# Patient Record
Sex: FEMALE | Race: OTHER | URBAN - METROPOLITAN AREA
[De-identification: names, ages, dates, MRNs, and addresses within clinical notes are randomized per-mention and may not be internally consistent; named-entity substitution may affect disease eponyms.]

---

## 2022-02-23 ENCOUNTER — OFFICE VISIT (OUTPATIENT)
Dept: FAMILY MEDICINE CLINIC | Facility: CLINIC | Age: 32
End: 2022-02-23
Payer: COMMERCIAL

## 2022-02-23 VITALS
HEART RATE: 65 BPM | WEIGHT: 123.38 LBS | DIASTOLIC BLOOD PRESSURE: 70 MMHG | SYSTOLIC BLOOD PRESSURE: 124 MMHG | OXYGEN SATURATION: 100 % | BODY MASS INDEX: 23.91 KG/M2 | HEIGHT: 60.24 IN | RESPIRATION RATE: 18 BRPM | TEMPERATURE: 98 F

## 2022-02-23 DIAGNOSIS — Z30.09 BIRTH CONTROL COUNSELING: Primary | ICD-10-CM

## 2022-02-23 LAB
CONTROL LINE PRESENT WITH A CLEAR BACKGROUND (YES/NO): YES YES/NO
PREGNANCY TEST, URINE: NEGATIVE

## 2022-02-23 PROCEDURE — 3078F DIAST BP <80 MM HG: CPT | Performed by: FAMILY MEDICINE

## 2022-02-23 PROCEDURE — 3074F SYST BP LT 130 MM HG: CPT | Performed by: FAMILY MEDICINE

## 2022-02-23 PROCEDURE — 81025 URINE PREGNANCY TEST: CPT | Performed by: FAMILY MEDICINE

## 2022-02-23 PROCEDURE — 3008F BODY MASS INDEX DOCD: CPT | Performed by: FAMILY MEDICINE

## 2022-02-23 PROCEDURE — 99203 OFFICE O/P NEW LOW 30 MIN: CPT | Performed by: FAMILY MEDICINE

## 2022-02-23 RX ORDER — NORETHINDRONE ACETATE AND ETHINYL ESTRADIOL, ETHINYL ESTRADIOL AND FERROUS FUMARATE 1MG-10(24)
1 KIT ORAL DAILY
Qty: 3 EACH | Refills: 11 | Status: SHIPPED | OUTPATIENT
Start: 2022-02-23 | End: 2023-02-23

## 2022-05-11 ENCOUNTER — OFFICE VISIT (OUTPATIENT)
Dept: FAMILY MEDICINE CLINIC | Facility: CLINIC | Age: 32
End: 2022-05-11
Payer: COMMERCIAL

## 2022-05-11 VITALS
DIASTOLIC BLOOD PRESSURE: 70 MMHG | SYSTOLIC BLOOD PRESSURE: 118 MMHG | HEART RATE: 76 BPM | WEIGHT: 123.38 LBS | HEIGHT: 60 IN | BODY MASS INDEX: 24.22 KG/M2 | RESPIRATION RATE: 16 BRPM | TEMPERATURE: 98 F | OXYGEN SATURATION: 98 %

## 2022-05-11 DIAGNOSIS — N30.01 ACUTE CYSTITIS WITH HEMATURIA: ICD-10-CM

## 2022-05-11 DIAGNOSIS — N39.0 URINARY TRACT INFECTION WITHOUT HEMATURIA, SITE UNSPECIFIED: Primary | ICD-10-CM

## 2022-05-11 DIAGNOSIS — R35.0 URINE FREQUENCY: ICD-10-CM

## 2022-05-11 DIAGNOSIS — N89.8 WHITE VAGINAL DISCHARGE: ICD-10-CM

## 2022-05-11 LAB
APPEARANCE: CLEAR
BILIRUBIN: NEGATIVE
GLUCOSE (URINE DIPSTICK): NEGATIVE MG/DL
KETONES (URINE DIPSTICK): NEGATIVE MG/DL
MULTISTIX LOT#: ABNORMAL NUMERIC
NITRITE, URINE: NEGATIVE
PH, URINE: 6 (ref 4.5–8)
PROTEIN (URINE DIPSTICK): NEGATIVE MG/DL
SPECIFIC GRAVITY: >1.005 (ref 1–1.03)
URINE-COLOR: YELLOW
UROBILINOGEN,SEMI-QN: 0.2 MG/DL (ref 0–1.9)

## 2022-05-11 PROCEDURE — 81003 URINALYSIS AUTO W/O SCOPE: CPT | Performed by: FAMILY MEDICINE

## 2022-05-11 PROCEDURE — 87510 GARDNER VAG DNA DIR PROBE: CPT | Performed by: FAMILY MEDICINE

## 2022-05-11 PROCEDURE — 87660 TRICHOMONAS VAGIN DIR PROBE: CPT | Performed by: FAMILY MEDICINE

## 2022-05-11 PROCEDURE — 3008F BODY MASS INDEX DOCD: CPT | Performed by: FAMILY MEDICINE

## 2022-05-11 PROCEDURE — 3078F DIAST BP <80 MM HG: CPT | Performed by: FAMILY MEDICINE

## 2022-05-11 PROCEDURE — 87086 URINE CULTURE/COLONY COUNT: CPT | Performed by: FAMILY MEDICINE

## 2022-05-11 PROCEDURE — 3074F SYST BP LT 130 MM HG: CPT | Performed by: FAMILY MEDICINE

## 2022-05-11 PROCEDURE — 87480 CANDIDA DNA DIR PROBE: CPT | Performed by: FAMILY MEDICINE

## 2022-05-11 PROCEDURE — 99214 OFFICE O/P EST MOD 30 MIN: CPT | Performed by: FAMILY MEDICINE

## 2022-05-11 RX ORDER — SULFAMETHOXAZOLE AND TRIMETHOPRIM 800; 160 MG/1; MG/1
1 TABLET ORAL 2 TIMES DAILY
Qty: 6 TABLET | Refills: 0 | Status: SHIPPED | OUTPATIENT
Start: 2022-05-11 | End: 2022-05-14

## 2022-05-11 RX ORDER — MULTIVIT-MIN/IRON/FOLIC ACID/K 18-600-40
CAPSULE ORAL
COMMUNITY

## 2022-05-12 RX ORDER — FLUCONAZOLE 150 MG/1
150 TABLET ORAL ONCE
Qty: 1 TABLET | Refills: 0 | Status: SHIPPED | OUTPATIENT
Start: 2022-05-12 | End: 2022-05-12

## 2022-10-03 ENCOUNTER — TELEPHONE (OUTPATIENT)
Dept: FAMILY MEDICINE CLINIC | Facility: CLINIC | Age: 32
End: 2022-10-03

## 2022-10-03 DIAGNOSIS — Z3A.01 LESS THAN 8 WEEKS GESTATION OF PREGNANCY: ICD-10-CM

## 2022-10-03 DIAGNOSIS — O98.911 PREGNANCY AND INFECTIOUS DISEASE, FIRST TRIMESTER: Primary | ICD-10-CM

## 2022-10-03 NOTE — TELEPHONE ENCOUNTER
Outreach call to pt, informed of referral for OB  Pt VU denies further needs or questions at this time

## 2022-10-04 ENCOUNTER — TELEPHONE (OUTPATIENT)
Dept: FAMILY MEDICINE CLINIC | Facility: CLINIC | Age: 32
End: 2022-10-04

## 2022-10-04 DIAGNOSIS — Z3A.01 LESS THAN 8 WEEKS GESTATION OF PREGNANCY: Primary | ICD-10-CM

## 2022-10-04 NOTE — TELEPHONE ENCOUNTER
S/w [facility]    Patient informed referral placed and that provider delivers at [facility]. Patient v/u

## 2022-10-04 NOTE — TELEPHONE ENCOUNTER
S/w [facility]    Patient is in search of a new Ob/gyne and is searching for the right fit, patient also requesting a referral in order to receive an appointment with provider [facility].     Referral pended

## 2022-10-04 NOTE — TELEPHONE ENCOUNTER
I signed referral for Dr. Brandi Tipton. Just make pt aware that Clemencia Tipton is not part of [facility]. Only delivers at [facility].    Thanks.

## 2022-10-04 NOTE — TELEPHONE ENCOUNTER
Samantha Valdivia with [facility] offices called asking for a referral with [facility] pre auth to be sent to them for pt to make appts with them since patient is pregnant and insurance needs Pre auth in order for them to see her.

## 2022-10-05 NOTE — TELEPHONE ENCOUNTER
Incoming call by Bob Alfaro from [facility] office, States the referral needs to be authorized by [facility] and should include [address] [place] [address] CPT code 55289  Since referral was placed as internal referral auto authorized.  External referral with requested CPT code pended, sent to provider for review

## 2022-10-07 NOTE — TELEPHONE ENCOUNTER
S/w [facility]    Informed patient that referral has been denied due to provider being out of network. Advised patient to contact her insurance company for a list of contracted ob/gyn providers and to contact us if she had an alternative provider in mind. Patient stated she was concerned she'd have to see different providers each time at [facility] office. Informed patient that this situation is so that she has the opportunity to meet every provider prior to delivery so that the provider on call at her delivery isn't someone she hasn't met. Patient v/u and stated she may keep her appointment with them.

## 2022-10-27 ENCOUNTER — OFFICE VISIT (OUTPATIENT)
Dept: OBGYN CLINIC | Facility: CLINIC | Age: 32
End: 2022-10-27
Payer: COMMERCIAL

## 2022-10-27 ENCOUNTER — ULTRASOUND ENCOUNTER (OUTPATIENT)
Dept: OBGYN CLINIC | Facility: CLINIC | Age: 32
End: 2022-10-27
Payer: COMMERCIAL

## 2022-10-27 VITALS
DIASTOLIC BLOOD PRESSURE: 66 MMHG | HEIGHT: 60 IN | WEIGHT: 123.63 LBS | HEART RATE: 91 BPM | SYSTOLIC BLOOD PRESSURE: 112 MMHG | BODY MASS INDEX: 24.27 KG/M2

## 2022-10-27 DIAGNOSIS — N91.1 SECONDARY AMENORRHEA: Primary | ICD-10-CM

## 2022-10-27 PROCEDURE — 76830 TRANSVAGINAL US NON-OB: CPT | Performed by: OBSTETRICS & GYNECOLOGY

## 2022-10-27 PROCEDURE — 99203 OFFICE O/P NEW LOW 30 MIN: CPT | Performed by: OBSTETRICS & GYNECOLOGY

## 2022-10-27 PROCEDURE — 76856 US EXAM PELVIC COMPLETE: CPT | Performed by: OBSTETRICS & GYNECOLOGY

## 2022-10-27 PROCEDURE — 3078F DIAST BP <80 MM HG: CPT | Performed by: OBSTETRICS & GYNECOLOGY

## 2022-10-27 PROCEDURE — 3074F SYST BP LT 130 MM HG: CPT | Performed by: OBSTETRICS & GYNECOLOGY

## 2022-10-27 PROCEDURE — 3008F BODY MASS INDEX DOCD: CPT | Performed by: OBSTETRICS & GYNECOLOGY

## 2022-10-27 RX ORDER — CHOLECALCIFEROL (VITAMIN D3) 25 MCG
1 TABLET,CHEWABLE ORAL DAILY
COMMUNITY

## 2022-10-27 NOTE — PATIENT INSTRUCTIONS
[facility]- Prenatal Testing     The following information is designed to help you understand some of the optional tests that are available to you to screen for problems in your pregnancy. Before considering any of these tests, you will need to consider the following questions:     [1] What would you do if an abnormality were detected? If the answer is nothing, then you may decide to decline all testing. [2] Would you be willing to undergo testing which might increase your risk of miscarriage, such as an amniocentesis or CVS (see below)? [3] If you have the initial testing, and it indicates that there might be a problem, and you subsequently decide not to do invasive testing such as an amniocentesis, would you worry excessively through the remainder of the pregnancy? The following tests are available to screen for problems in your pregnancy:     [1] First Trimester Screening (also called Nuchal Thickness, Nuchal Translucency or NT)- This is an abdominal ultrasound done between 10 and 14 weeks by a perinatology specialist (Maternal Fetal Medicine, MFM) which measures the thickness of the skin behind your baby’s neck. Increased thickness of the skin in this area has been linked to an increased risk of genetic abnormalities like Down Syndrome. A second visit may be required if the baby is not in the correct position. The ultrasound results are combined with a finger stick blood test to increase the sensitivity of the test.  You will need to schedule an appointment with the Maternal Fetal Medicine office.   Address and phone number below:   Please verify insurance coverage:   CPT code: 21109 (velazquez) or 28454 (twins)   Diagnosis: Genetic Screening- Z36.8A   Maternal Fetal Medicine   Dr. Ale Dangelo, Dr. Evelio Garsia, Dr. Phoebe Ferguson and Dr. Sky Reed [address] [facility]   [place], [address]   Phone [phone]   Fax [phone]     [2]  Cell-Free DNA testing ([id number]/[facility])- This is a blood test done any time after 10-11 weeks which screens for genetic abnormalities like Down Syndrome. It is greater than 98% sensitive, but requires an amniocentesis for confirmation if abnormal.  It can also tell you the sex of the baby. Please call [employer]/[facility] at [phone] to verify insurance coverage:   CPT code: 15822   Diagnosis code: Genetic screening- Z36.8A     [3]  Quad Screen (also called AFP-plus or Tetra Screen)-  This is a simple blood test which screens for both genetic abnormalities like Down Syndrome and neural tube defects (NTDs), such as spina bifida (an abnormal opening in the spine which can cause paralysis). It is usually performed around 16-20 weeks. If the Quad screen comes back abnormal, it does not mean that your baby definitely has an abnormality. It only means that there is an increased risk of an abnormality. Additional testing such as a Level II Ultrasound or Amniocentesis may be recommended (see below). This test is less accurate at picking up genetic abnormalities than the cell-free DNA test.  If you have test #1 or 2, then usually only the AFP part of the Quad screen would be performed to screen for NTD’s (see below). Please verify insurance coverage:   CPT code: 88738   Diagnosis code: Genetic screening- Z36.8A     [4] Alpha Fetoprotein (AFP, MSAFP)- This is a simple blood test that screens for neural tube defects such as spina bifida (an abnormal opening in the spine). It is usually performed around 16-20 weeks. Additional testing such as a Level II ultrasound may be recommended for an abnormal test result. Please verify insurance coverage:   CPT code: 68806   Diagnosis code: Genetic Screening- Z36.8A     [5]  Cystic Fibrosis/SMA Carrier Screening-  Cystic Fibrosis is a genetic disease which causes lung problems in the infant. SMA (spinal muscular atrophy) is a genetic disease that affects the nerve cells of the spinal cord.   These genetic defects would need to be passed from both parents to the child. A blood test is performed on the mother, and if her test is positive, then the father should also be tested. These tests can be done at any time in the pregnancy, usually in the first trimester with your initial OB labs. All babies are screened for cystic fibrosis after birth. Please verify insurance coverage:   Cystic Fibrosis CPT code: 11916     Diagnosis code: Cystic Fibrosis screening- Z13.228   SMA CPT code: 55636  Diagnosis code: Genetic Screening- Z36.8A , or Testing for Genetic Disease Carrier- Z13.71     [6]  Level II Ultrasound-  This is an abdominal ultrasound done at approximately 20-21 weeks by a perinatology specialist (FRANKLIN) at [facility] which looks at many of the baby’s internal structures. Abnormalities in certain structures have been associated with an increased risk of genetic abnormalities. It also screens for NTD’s. This ultrasound would replace the Level I Ultrasound that we normally perform at our office around the same time. [7]  Amniocentesis-  During this procedure, a needle is passed through your abdominal wall to withdrawal some amniotic fluid from around the baby. It screens for both genetic abnormalities and NTD’s, and is usually performed around 15-16 weeks. This test has the highest accuracy for detecting genetic abnormalities, but there may be a small risk of miscarriage or other complications. A similar procedure called Chorionic Villus Sampling (CVS) is performed by passing a catheter through the vagina to remove a small sample of tissue from the placenta (afterbirth). CVS may have a higher risk of miscarriage and other rare complications compared to amniocentesis, but can be performed earlier in pregnancy.   Amniocentesis is often recommended/offered if any of the previously mentioned tests come back abnormal.  A pamphlet is available if you would like more information about this option. If you decide to have an amniocentesis, the other tests would be unnecessary. The above information covers some of the basics. Pamphlets on the Cell-Free DNA test, Quad Screen and Cystic Fibrosis test should be in your prenatal packet. Your doctor will discuss this information in more detail, and feel free to ask additional questions. Also, remember that all of these tests are optional, and will only be performed at your request.  Testing that needs to be done through the perinatologist's office may require 2-3 weeks lead time to schedule. During pregnancy, here are some general recommendations:  Prenatal Vitamins: Pregnant women should consume the following each day through diet or supplements:  o Folic acid 283-677 micrograms   o Iron 30 mg (or be screened for anemia)  o Vitamin D 600 international units  o Calcium 1,000 mg  Avoid cigarette smoking, alcohol, drugs, and vaping  Avoid unpasteurized cheeses  Avoid raw or undercooked fish or meats  If you have deli meat, please microwave it or cook on the stove until steaming  Artificial Sweeteners: Artificial sweeteners can be used in pregnancy. Data regarding saccharin are conflicting. Low (typical) consumption is likely safe  Caffeine: Low-to-moderate caffeine intake in pregnancy does not appear to be associated with any adverse outcomes. Pregnant women may have caffeine but should probably limit it to less than 300 mg/d (a typical 8-ounce cup of brewed coffee has approximately 130 mg of caffeine. An 8-ounce cup of tea or 12-ounce soda has approximately 50 mg of caffeine), but exact amounts vary based on the specific beverage or food. Avoid hot tub use  Hair dye is presumed safe, but there are no randomized trials. Insect Repellants: Topical insect repellants (including DEET) can be used in pregnancy and should be used in areas with high risk for insect-borne illnesses.   Women should avoid fish with high mercury content, including gaby mackerel, shark, swordfish, marlin, and tilefish. The mercury content of commercial fish can be found at              https://Twiigg/. 34              Another good resource can be found online at the [facility]. Food and Drug Administration website                 [employer]. 12. Wash all fruits and veggies before eating  13. Oral health and routine dental procedures should continue as scheduled during pregnancy. These include cleanings, extraction, scaling, root          canal, radiographs (assuming the abdomen and thyroid are shielded), and restoration and fillings  14. Please check the [facility] website regarding the [place] virus if you are planning to travel to [facility] or [facility]  15. Genetic testing is available for chromosomal abnormalities in the first trimester. The goal timing of this is between 11-14 weeks. 12. Genetic testing is available for cystic fibrosis and spinal muscular atrophy.      Recommended weight gain goals:

## 2022-11-14 ENCOUNTER — OFFICE VISIT (OUTPATIENT)
Dept: OBGYN CLINIC | Facility: CLINIC | Age: 32
End: 2022-11-14
Payer: COMMERCIAL

## 2022-11-14 ENCOUNTER — ULTRASOUND ENCOUNTER (OUTPATIENT)
Dept: OBGYN CLINIC | Facility: CLINIC | Age: 32
End: 2022-11-14
Payer: COMMERCIAL

## 2022-11-14 VITALS
WEIGHT: 126.38 LBS | HEART RATE: 51 BPM | BODY MASS INDEX: 24.81 KG/M2 | HEIGHT: 60 IN | DIASTOLIC BLOOD PRESSURE: 66 MMHG | SYSTOLIC BLOOD PRESSURE: 112 MMHG

## 2022-11-14 DIAGNOSIS — N91.1 SECONDARY AMENORRHEA: Primary | ICD-10-CM

## 2022-11-14 DIAGNOSIS — O02.1 MISSED ABORTION: ICD-10-CM

## 2022-11-14 RX ORDER — MISOPROSTOL 200 UG/1
TABLET ORAL
Qty: 8 TABLET | Refills: 0 | Status: SHIPPED | OUTPATIENT
Start: 2022-11-14

## 2022-11-14 NOTE — PATIENT INSTRUCTIONS
Options  1. Expectant management: continue to await passage of tissue. This can take days or months  2. Medication: this is effective approximately 80% of the time. First dose vaginal, repeat in 24 hours if no passage of tissue. If you experience heavy bleeding soaking a pad per hour x 2 hours in a row or have severe pain not responsive to oral medications, call or come to the ER immediately. You will expect the bleeding to increase to a peak, pass tissue, and then decrease  3. Surgery: dilation and curettage. Risks overall low with first procedure, but risks increase with any subsequent procedure, of scar tissue formation. This is an outpatient procedure, someone would need to drive you home. It is under anesthesia. The bleeding after miscarriage can last 1-2 weeks. I would want you to wait at least one normal period to try again    The most common cause of miscarriage is chromosomal issue. We could test for this if you had a D&C.  Most likely It will not happen again    Please go to [facility] and have the labs drawn that I had recommended

## 2022-11-16 LAB
ABSOLUTE BASOPHILS: 42 CELLS/UL (ref 0–200)
ABSOLUTE EOSINOPHILS: 90 CELLS/UL (ref 15–500)
ABSOLUTE LYMPHOCYTES: 1972 CELLS/UL (ref 850–3900)
ABSOLUTE MONOCYTES: 371 CELLS/UL (ref 200–950)
ABSOLUTE NEUTROPHILS: 2825 CELLS/UL (ref 1500–7800)
BASOPHILS: 0.8 %
EOSINOPHILS: 1.7 %
HCG, TOTAL, QN: 8056 MIU/ML
HEMATOCRIT: 39.6 % (ref 35–45)
HEMOGLOBIN: 12.9 G/DL (ref 11.7–15.5)
LYMPHOCYTES: 37.2 %
MCH: 26.8 PG (ref 27–33)
MCHC: 32.6 G/DL (ref 32–36)
MCV: 82.3 FL (ref 80–100)
MONOCYTES: 7 %
MPV: 11.7 FL (ref 7.5–12.5)
NEUTROPHILS: 53.3 %
PLATELET COUNT: 306 THOUSAND/UL (ref 140–400)
PROGESTERONE: 8.3 NG/ML
RDW: 13.5 % (ref 11–15)
RED BLOOD CELL COUNT: 4.81 MILLION/UL (ref 3.8–5.1)
WHITE BLOOD CELL COUNT: 5.3 THOUSAND/UL (ref 3.8–10.8)

## 2022-11-18 PROCEDURE — 99284 EMERGENCY DEPT VISIT MOD MDM: CPT | Performed by: EMERGENCY MEDICINE

## 2022-11-19 ENCOUNTER — HOSPITAL ENCOUNTER (EMERGENCY)
Facility: HOSPITAL | Age: 32
Discharge: HOME | End: 2022-11-19
Attending: EMERGENCY MEDICINE
Payer: COMMERCIAL

## 2022-11-19 VITALS
OXYGEN SATURATION: 96 % | RESPIRATION RATE: 20 BRPM | SYSTOLIC BLOOD PRESSURE: 100 MMHG | TEMPERATURE: 100 F | BODY MASS INDEX: 21.79 KG/M2 | DIASTOLIC BLOOD PRESSURE: 64 MMHG | HEIGHT: 63 IN | WEIGHT: 123 LBS | HEART RATE: 79 BPM

## 2022-11-19 DIAGNOSIS — J06.9 VIRAL URI: Primary | ICD-10-CM

## 2022-11-19 LAB
ALBUMIN SERPL-MCNC: 4 G/DL (ref 3.4–5)
ALBUMIN/GLOB SERPL: 0.9 {RATIO} (ref 1–2)
ALP LIVER SERPL-CCNC: 37 U/L
ALT SERPL-CCNC: 54 U/L
ANION GAP SERPL CALC-SCNC: 5 MMOL/L (ref 0–18)
AST SERPL-CCNC: 34 U/L (ref 15–37)
B-HCG SERPL-ACNC: 887 MIU/ML
BASOPHILS # BLD AUTO: 0.02 X10(3) UL (ref 0–0.2)
BASOPHILS NFR BLD AUTO: 0.3 %
BILIRUB SERPL-MCNC: 0.2 MG/DL (ref 0.1–2)
BUN BLD-MCNC: 13 MG/DL (ref 7–18)
CALCIUM BLD-MCNC: 9.5 MG/DL (ref 8.5–10.1)
CHLORIDE SERPL-SCNC: 103 MMOL/L (ref 98–112)
CO2 SERPL-SCNC: 28 MMOL/L (ref 21–32)
CREAT BLD-MCNC: 0.66 MG/DL
EOSINOPHIL # BLD AUTO: 0.01 X10(3) UL (ref 0–0.7)
EOSINOPHIL NFR BLD AUTO: 0.1 %
ERYTHROCYTE [DISTWIDTH] IN BLOOD BY AUTOMATED COUNT: 13.4 %
GFR SERPLBLD BASED ON 1.73 SQ M-ARVRAT: 118 ML/MIN/1.73M2 (ref 60–?)
GLOBULIN PLAS-MCNC: 4.4 G/DL (ref 2.8–4.4)
GLUCOSE BLD-MCNC: 93 MG/DL (ref 70–99)
HCT VFR BLD AUTO: 35.7 %
HGB BLD-MCNC: 11.9 G/DL
IMM GRANULOCYTES # BLD AUTO: 0.01 X10(3) UL (ref 0–1)
IMM GRANULOCYTES NFR BLD: 0.1 %
LYMPHOCYTES # BLD AUTO: 1.15 X10(3) UL (ref 1–4)
LYMPHOCYTES NFR BLD AUTO: 15.9 %
MCH RBC QN AUTO: 27.5 PG (ref 26–34)
MCHC RBC AUTO-ENTMCNC: 33.3 G/DL (ref 31–37)
MCV RBC AUTO: 82.6 FL
MONOCYTES # BLD AUTO: 0.47 X10(3) UL (ref 0.1–1)
MONOCYTES NFR BLD AUTO: 6.5 %
NEUTROPHILS # BLD AUTO: 5.56 X10 (3) UL (ref 1.5–7.7)
NEUTROPHILS # BLD AUTO: 5.56 X10(3) UL (ref 1.5–7.7)
NEUTROPHILS NFR BLD AUTO: 77.1 %
OSMOLALITY SERPL CALC.SUM OF ELEC: 282 MOSM/KG (ref 275–295)
PLATELET # BLD AUTO: 222 10(3)UL (ref 150–450)
POTASSIUM SERPL-SCNC: 4 MMOL/L (ref 3.5–5.1)
PROT SERPL-MCNC: 8.4 G/DL (ref 6.4–8.2)
RBC # BLD AUTO: 4.32 X10(6)UL
RH BLOOD TYPE: POSITIVE
SODIUM SERPL-SCNC: 136 MMOL/L (ref 136–145)
WBC # BLD AUTO: 7.2 X10(3) UL (ref 4–11)

## 2022-11-19 PROCEDURE — 96360 HYDRATION IV INFUSION INIT: CPT | Performed by: EMERGENCY MEDICINE

## 2022-11-19 PROCEDURE — 85025 COMPLETE CBC W/AUTO DIFF WBC: CPT | Performed by: EMERGENCY MEDICINE

## 2022-11-19 PROCEDURE — 86900 BLOOD TYPING SEROLOGIC ABO: CPT | Performed by: EMERGENCY MEDICINE

## 2022-11-19 PROCEDURE — 84702 CHORIONIC GONADOTROPIN TEST: CPT | Performed by: EMERGENCY MEDICINE

## 2022-11-19 PROCEDURE — 80053 COMPREHEN METABOLIC PANEL: CPT | Performed by: EMERGENCY MEDICINE

## 2022-11-19 PROCEDURE — 86901 BLOOD TYPING SEROLOGIC RH(D): CPT | Performed by: EMERGENCY MEDICINE

## 2022-11-19 RX ORDER — IBUPROFEN 800 MG/1
800 TABLET ORAL ONCE
Status: COMPLETED | OUTPATIENT
Start: 2022-11-19 | End: 2022-11-19

## 2022-11-19 NOTE — ED INITIAL ASSESSMENT (HPI)
Patient has had a fever since Midnight. Was seen in IC today Negative Resp/Flu panel. Patient had a miscarriage Tuesday in lieu of a D&C patient took vaginal pill insertion option. Concerned this could be a complication of the miscarriage.

## 2022-11-29 ENCOUNTER — ULTRASOUND ENCOUNTER (OUTPATIENT)
Dept: OBGYN CLINIC | Facility: CLINIC | Age: 32
End: 2022-11-29
Payer: COMMERCIAL

## 2022-11-29 DIAGNOSIS — O02.1 MISSED ABORTION: Primary | ICD-10-CM

## 2022-11-29 PROCEDURE — 76830 TRANSVAGINAL US NON-OB: CPT | Performed by: OBSTETRICS & GYNECOLOGY

## 2022-12-02 NOTE — PROGRESS NOTES
Please let patient know her US shows likely miscarriage is completed. I would suggest a repeat serum HCG at [facility] next week to ensure it is trending down. Please order.  Thank you

## 2022-12-07 DIAGNOSIS — O02.1 MISSED ABORTION: Primary | ICD-10-CM

## 2022-12-07 NOTE — PROGRESS NOTES
Contacted patient. Reported results and recommendations. Patient states understanding. Order placed for hcg. Faxed via epic to [employer] in [place] at [phone]

## 2023-02-06 ENCOUNTER — TELEPHONE (OUTPATIENT)
Dept: OBGYN CLINIC | Facility: CLINIC | Age: 33
End: 2023-02-06

## 2023-02-06 NOTE — TELEPHONE ENCOUNTER
HCG order has not been completed. Patient was seen for missed . Called patient; no answer. Left a message to call back.

## 2023-02-06 NOTE — TELEPHONE ENCOUNTER
Patient notified needs to get HCG done. Understanding expressed and she will get this done today or tomorrow.

## 2023-02-08 ENCOUNTER — TELEPHONE (OUTPATIENT)
Dept: OBGYN CLINIC | Facility: CLINIC | Age: 33
End: 2023-02-08

## 2023-02-08 ENCOUNTER — PATIENT MESSAGE (OUTPATIENT)
Dept: OBGYN CLINIC | Facility: CLINIC | Age: 33
End: 2023-02-08

## 2023-02-08 DIAGNOSIS — O09.299 HISTORY OF MISCARRIAGE, CURRENTLY PREGNANT: Primary | ICD-10-CM

## 2023-02-08 LAB — HCG, TOTAL, QN: 130 MIU/ML

## 2023-02-08 NOTE — TELEPHONE ENCOUNTER
Patient with + pregnancy test.     LMP: 1/6/23    History of SAB. No medications other than PNV. RN has already spoken with her. Please contact to schedule . Thank you!

## 2023-02-09 NOTE — TELEPHONE ENCOUNTER
Pt scheduled    Future Appointments   Date Time Provider Alec Coates   3/8/2023  9:45 AM [facility] OB US [place] [facility] [place]   3/8/2023 10:15 AM Niki Cifuentes MD [facility] [place]   3/30/2023  2:00 PM MARYAN Ortiz [facility] [place]

## 2023-02-10 DIAGNOSIS — O02.1 MISSED ABORTION: Primary | ICD-10-CM

## 2023-02-10 LAB
HCG, TOTAL, QN: 44 MIU/ML
PROGESTERONE: 1.4 NG/ML

## 2023-02-10 NOTE — TELEPHONE ENCOUNTER
Pt scheduled    Future Appointments   Date Time Provider Alec Coates   2/15/2023  9:30 AM MARYAN Arriaga [facility] OB[facility]N O [facility]

## 2023-02-10 NOTE — PROGRESS NOTES
Pt had labs drawn because she was called about an old order, had it done, incidental pregnancy found  However, it appears this was likely a chemical pregnancy, please inform pt  HCG is decreasing  I would suggest another HCG on Monday, please order/fax to [facility].  She also should have a f/u visit in the office to discuss things, her menses, additional labs. She is RH+.  Thank you

## 2023-02-10 NOTE — PROGRESS NOTES
Contacted patient results and recommendations given. Patient verbalized understanding and willingness to comply. States she started her cycle. No further questions. Order faxed to [address] [place] [address] via [address] [place] [address] RdJustyna

## 2023-02-10 NOTE — TELEPHONE ENCOUNTER
Patient does not need  or NOB. She does need the next available OV with Ashtyn or any other provider. Per lab results: She also should have a f/u visit in the office to discuss things, her menses, additional labs. She is RH+.  Thank you

## 2023-02-14 LAB — HCG, TOTAL, QN: 10 MIU/ML

## 2023-02-15 ENCOUNTER — OFFICE VISIT (OUTPATIENT)
Dept: OBGYN CLINIC | Facility: CLINIC | Age: 33
End: 2023-02-15
Payer: COMMERCIAL

## 2023-02-15 VITALS
BODY MASS INDEX: 23 KG/M2 | WEIGHT: 129.19 LBS | DIASTOLIC BLOOD PRESSURE: 74 MMHG | HEART RATE: 87 BPM | SYSTOLIC BLOOD PRESSURE: 112 MMHG

## 2023-02-15 DIAGNOSIS — O02.1 MISSED ABORTION: Primary | ICD-10-CM

## 2023-02-15 PROCEDURE — 3074F SYST BP LT 130 MM HG: CPT | Performed by: NURSE PRACTITIONER

## 2023-02-15 PROCEDURE — 3078F DIAST BP <80 MM HG: CPT | Performed by: NURSE PRACTITIONER

## 2023-02-15 PROCEDURE — 99213 OFFICE O/P EST LOW 20 MIN: CPT | Performed by: NURSE PRACTITIONER

## 2023-02-15 NOTE — PROGRESS NOTES
Subjective:  26year old    Patient presents with: Follow - Up: PT had a miscarriage , pt had HCG levels checked 23    Pt here today to discuss recent HCG labs  Was evaluated in 2022 for spontaneous miscarriage  HCG labs were ordered to follow HCG level to zero  Pt had regular menses in December and January following miscarriage  Pt was notified of overdue lab results and went to lab   Lab result indicated a pregnancy  However follow up HCG decreased  Denies abnormal bleeding and pain    Review of Systems:  Pertinent items are noted in the HPI. Objective: Wt 129 lb 3.2 oz (58.6 kg)   LMP 02/10/2023 (Exact Date)   Physical Examination:  General appearance: Well dressed, well nourished in no apparent distress  Neurologic/Psychiatric: Alert and oriented to person, place and time, mood normal, affect appropriate    Assessment/Plan:    Diagnoses and all orders for this visit:    Missed   - discussed that this is likely a chemical pregnancy d/t timing  - would like to follow serial HCG to 0     -  HCG, BETA SUBUNIT (QUANT PREGNANCY TEST) - to be completed Monday  - Recommended 1 normal menses before resuming attempts at pregnancy  - pt verbalizes understanding    Return if symptoms worsen or fail to improve.

## 2023-02-21 LAB — HCG, TOTAL, QN: <3 MIU/ML

## 2023-05-10 ENCOUNTER — TELEPHONE (OUTPATIENT)
Dept: OBGYN CLINIC | Facility: CLINIC | Age: 33
End: 2023-05-10

## 2023-05-10 NOTE — TELEPHONE ENCOUNTER
Patient calling to initiate prenatal care  LMP 4/3  Patient is 7-8 weeks on 5/30  Confirmation Ultrasound and Appointment scheduled on   Future Appointments   Date Time Provider Alec Estesi   6/7/2023  2:15 PM [facility] OB US [facility] OB[facility]N P [facility] [address]   6/7/2023  2:45 PM Dino Niño MD [facility] OB/[facility]N P [facility] [address]   6/28/2023  1:30 PM PATRICIA Li [facility] OB/GYN P [facility] [address]   7/19/2023  1:15 PM Jenifer Paz MD [facility] OB[facility]GYN [facility] [place]         Any history of ectopic pregnancy? no  Any history of miscarriage?  yes  Any medications that you are taking on a regular basis other than prenatal vitamins?  no (if not taking prenatal vitamins, encourage patient to start taking.)  Any bleeding since the first day of last LMP and your positive pregnancy test? no    Insurance [facility]

## 2023-05-11 NOTE — TELEPHONE ENCOUNTER
Established patient. LMP: 4/3/23  GA: 5 3/7 wks    Contacted patient. Discussed bleeding/ER precautions and questions answered about plan of care. Patient states understanding.     Provided food info, med list, and n/v help via [employer]

## 2023-06-06 PROBLEM — O02.1 MISSED ABORTION: Status: RESOLVED | Noted: 2022-11-14 | Resolved: 2023-06-06

## 2023-06-06 PROBLEM — O09.529 AMA (ADVANCED MATERNAL AGE) MULTIGRAVIDA 35+: Status: ACTIVE | Noted: 2023-06-06

## 2023-06-06 PROBLEM — O09.529 AMA (ADVANCED MATERNAL AGE) MULTIGRAVIDA 35+ (HCC): Status: ACTIVE | Noted: 2023-06-06

## 2023-06-06 PROBLEM — O02.1 MISSED ABORTION (HCC): Status: RESOLVED | Noted: 2022-11-14 | Resolved: 2023-06-06

## 2023-06-07 ENCOUNTER — ULTRASOUND ENCOUNTER (OUTPATIENT)
Dept: OBGYN CLINIC | Facility: CLINIC | Age: 33
End: 2023-06-07
Payer: COMMERCIAL

## 2023-06-07 ENCOUNTER — OFFICE VISIT (OUTPATIENT)
Dept: OBGYN CLINIC | Facility: CLINIC | Age: 33
End: 2023-06-07
Payer: COMMERCIAL

## 2023-06-07 VITALS
SYSTOLIC BLOOD PRESSURE: 106 MMHG | BODY MASS INDEX: 24.94 KG/M2 | DIASTOLIC BLOOD PRESSURE: 70 MMHG | WEIGHT: 127 LBS | HEIGHT: 60 IN

## 2023-06-07 DIAGNOSIS — N91.2 AMENORRHEA: Primary | ICD-10-CM

## 2023-06-07 DIAGNOSIS — O21.0 HYPEREMESIS GRAVIDARUM: ICD-10-CM

## 2023-06-07 PROCEDURE — 76856 US EXAM PELVIC COMPLETE: CPT | Performed by: OBSTETRICS & GYNECOLOGY

## 2023-06-07 PROCEDURE — 3008F BODY MASS INDEX DOCD: CPT | Performed by: OBSTETRICS & GYNECOLOGY

## 2023-06-07 PROCEDURE — 99213 OFFICE O/P EST LOW 20 MIN: CPT | Performed by: OBSTETRICS & GYNECOLOGY

## 2023-06-07 PROCEDURE — 3078F DIAST BP <80 MM HG: CPT | Performed by: OBSTETRICS & GYNECOLOGY

## 2023-06-07 PROCEDURE — 3074F SYST BP LT 130 MM HG: CPT | Performed by: OBSTETRICS & GYNECOLOGY

## 2023-06-07 RX ORDER — PROMETHAZINE HYDROCHLORIDE 25 MG/1
25 TABLET ORAL EVERY 6 HOURS PRN
Qty: 40 TABLET | Refills: 1 | Status: SHIPPED | OUTPATIENT
Start: 2023-06-07

## 2023-06-07 NOTE — PROGRESS NOTES
Subjective:  Patient presents complaining of no menses. Positive home pregnancy test.  LMP 4/3/23. No family history of any inheritable diseases or congenital birth defects on either side of family. Nausea somewhat improved with unisom/B6    Objective:  /70   Ht 60\"   Wt 127 lb (57.6 kg)   LMP 04/03/2023 (Exact Date)     Physical Examination:  General appearance: Well dressed, well nourished in no apparent distress  Neurologic/Psychiatric: Alert and oriented to person, place and time, mood normal, affect appropriate    Summary of Ultrasound Findings:  LMP 4/3/23  9w2day by LMP;  88a3sbp by ultrasound  Viable intrauterine pregnancy  Dates consistent with ultrasound. Final EDC:  1/8/24 by LMP consistent with ultrasound on 6/7/2023  Normal ovaries bilaterally     Assessment/Plan:  Amenorrhea- Normal dating ultrasound  AMA- counseled. HEG- trial of phenergan  Follow up 3-4 weeks for new OB visit. Prenatal vitamin with 0.4 mg folate, DHA. Optional prenatal screening tests reviewed including cfdna, carrier screen/CF, NT/first trimester screen, Quad/AFP, Level 2 ultrasound, amniocentesis/CVS.  Bleeding precautions provided. Diagnoses and all orders for this visit:    Amenorrhea  -     US PELVIS, ABDOMINAL GYNE [facility] ONLY; Future    Hyperemesis gravidarum  -     promethazine 25 MG Oral Tab; Take 1 tablet (25 mg total) by mouth every 6 (six) hours as needed for Nausea. Other orders  -     OB/GYNE ULTRASOUND SCAN  -     OB/GYNE ULTRASOUND REPORT       Return for New OB Visit.

## 2023-06-28 ENCOUNTER — INITIAL PRENATAL (OUTPATIENT)
Dept: OBGYN CLINIC | Facility: CLINIC | Age: 33
End: 2023-06-28
Payer: COMMERCIAL

## 2023-06-28 VITALS
DIASTOLIC BLOOD PRESSURE: 68 MMHG | SYSTOLIC BLOOD PRESSURE: 104 MMHG | BODY MASS INDEX: 24.97 KG/M2 | HEIGHT: 60 IN | WEIGHT: 127.19 LBS

## 2023-06-28 DIAGNOSIS — Z12.4 CERVICAL CANCER SCREENING: ICD-10-CM

## 2023-06-28 DIAGNOSIS — Z36.8A ENCOUNTER FOR ANTENATAL SCREENING FOR OTHER GENETIC DEFECT: ICD-10-CM

## 2023-06-28 DIAGNOSIS — Z34.00 SUPERVISION OF NORMAL FIRST PREGNANCY, ANTEPARTUM: Primary | ICD-10-CM

## 2023-06-28 DIAGNOSIS — O09.519 ADVANCED MATERNAL AGE, PRIMIGRAVIDA, ANTEPARTUM: ICD-10-CM

## 2023-06-28 LAB
GLUCOSE (URINE DIPSTICK): NEGATIVE MG/DL
MULTISTIX LOT#: NORMAL NUMERIC
PROTEIN (URINE DIPSTICK): NEGATIVE MG/DL

## 2023-06-28 PROCEDURE — 3078F DIAST BP <80 MM HG: CPT | Performed by: NURSE PRACTITIONER

## 2023-06-28 PROCEDURE — 3008F BODY MASS INDEX DOCD: CPT | Performed by: NURSE PRACTITIONER

## 2023-06-28 PROCEDURE — 3074F SYST BP LT 130 MM HG: CPT | Performed by: NURSE PRACTITIONER

## 2023-06-28 PROCEDURE — 81002 URINALYSIS NONAUTO W/O SCOPE: CPT | Performed by: NURSE PRACTITIONER

## 2023-06-29 ENCOUNTER — LAB ENCOUNTER (OUTPATIENT)
Dept: LAB | Age: 33
End: 2023-06-29
Attending: NURSE PRACTITIONER
Payer: COMMERCIAL

## 2023-06-29 DIAGNOSIS — Z36.8A ENCOUNTER FOR ANTENATAL SCREENING FOR OTHER GENETIC DEFECT: ICD-10-CM

## 2023-06-29 DIAGNOSIS — O09.519 ADVANCED MATERNAL AGE, PRIMIGRAVIDA, ANTEPARTUM: ICD-10-CM

## 2023-07-01 LAB
ABSOLUTE BASOPHILS: 29 CELLS/UL (ref 0–200)
ABSOLUTE EOSINOPHILS: 29 CELLS/UL (ref 15–500)
ABSOLUTE LYMPHOCYTES: 1340 CELLS/UL (ref 850–3900)
ABSOLUTE MONOCYTES: 342 CELLS/UL (ref 200–950)
ABSOLUTE NEUTROPHILS: 4060 CELLS/UL (ref 1500–7800)
BASOPHILS: 0.5 %
EOSINOPHILS: 0.5 %
HEMATOCRIT: 37.7 % (ref 35–45)
HEMOGLOBIN: 12.2 G/DL (ref 11.7–15.5)
LYMPHOCYTES: 23.1 %
MCH: 27.5 PG (ref 27–33)
MCHC: 32.4 G/DL (ref 32–36)
MCV: 85.1 FL (ref 80–100)
MONOCYTES: 5.9 %
MPV: 12 FL (ref 7.5–12.5)
NEUTROPHILS: 70 %
PLATELET COUNT: 252 THOUSAND/UL (ref 140–400)
RDW: 13.2 % (ref 11–15)
RED BLOOD CELL COUNT: 4.43 MILLION/UL (ref 3.8–5.1)
RUBELLA ANTIBODY (IGG): 8.19 INDEX
WHITE BLOOD CELL COUNT: 5.8 THOUSAND/UL (ref 3.8–10.8)

## 2023-07-03 LAB
CHLAMYDIA TRACHOMATIS$RNA, TMA: NOT DETECTED
HPV MRNA E6/E7: DETECTED
NEISSERIA GONORRHOEAE$RNA, TMA: NOT DETECTED

## 2023-07-05 LAB
11Q23 DELETION (JACOBSEN): NOT DETECTED
15Q11 DELETION (PW ANGELMAN): NOT DETECTED
1P36 DELETION SYNDROME: NOT DETECTED
22Q11 DELETION (DIGEORGE): NOT DETECTED
4P16 DELETION(WOLF-HIRSCHHORN): NOT DETECTED
5P15 DELETION (CRI-DU-CHAT): NOT DETECTED
8Q24 DELETION (LANGER-GIEDION): NOT DETECTED
MONOSOMY X (TURNER SYNDROME): NOT DETECTED
TEST RESULT: NEGATIVE
TRISOMY 13 (PATAU SYNDROME): NEGATIVE
TRISOMY 16: NOT DETECTED
TRISOMY 18 (EDWARDS SYNDROME): NEGATIVE
TRISOMY 21 (DOWN SYNDROME): NEGATIVE
TRISOMY 22: NOT DETECTED
XXX (TRIPLE X SYNDROME): NOT DETECTED
XXY (KLINEFELTER SYNDROME): NOT DETECTED
XYY (JACOBS SYNDROME): NOT DETECTED

## 2023-07-07 PROBLEM — R87.810 ASCUS WITH POSITIVE HIGH RISK HPV CERVICAL: Status: ACTIVE | Noted: 2023-07-07

## 2023-07-07 PROBLEM — R87.610 ASCUS WITH POSITIVE HIGH RISK HPV CERVICAL: Status: ACTIVE | Noted: 2023-07-07

## 2023-07-19 ENCOUNTER — ROUTINE PRENATAL (OUTPATIENT)
Dept: OBGYN CLINIC | Facility: CLINIC | Age: 33
End: 2023-07-19
Payer: COMMERCIAL

## 2023-07-19 VITALS
SYSTOLIC BLOOD PRESSURE: 110 MMHG | BODY MASS INDEX: 25 KG/M2 | DIASTOLIC BLOOD PRESSURE: 62 MMHG | WEIGHT: 130.13 LBS | HEART RATE: 92 BPM

## 2023-07-19 DIAGNOSIS — Z3A.15 15 WEEKS GESTATION OF PREGNANCY: ICD-10-CM

## 2023-07-19 DIAGNOSIS — O09.519 ADVANCED MATERNAL AGE, PRIMIGRAVIDA, ANTEPARTUM: ICD-10-CM

## 2023-07-19 DIAGNOSIS — Z34.00 SUPERVISION OF NORMAL FIRST PREGNANCY, ANTEPARTUM: Primary | ICD-10-CM

## 2023-07-19 PROCEDURE — 3078F DIAST BP <80 MM HG: CPT | Performed by: OBSTETRICS & GYNECOLOGY

## 2023-07-19 PROCEDURE — 3074F SYST BP LT 130 MM HG: CPT | Performed by: OBSTETRICS & GYNECOLOGY

## 2023-07-19 NOTE — PROGRESS NOTES
ORAL    GA 15w2d   23  1328   BP: 110/62   Pulse: 92   Weight: 130 lb 2 oz (59 kg)       Doing well. No complaints. Denies abdominal/pelvic pain or vaginal bleeding. Rh +   Genetic screening NIPT neg. D/w patient MSAFP. Recommend Anatomy scan 20 wks   Prenatal labs reviewed   Patient was seen in the OSH immediate care on 2023 with syncope. Reminded patient to complete colposcopy secondary to abnormal Pap smear on 7/3/2023 noted for ASCUS with HPV positive  Continue low dose ASA, 2 tablets daily for pre eclampsia prevention     Problem List Items Addressed This Visit          Gravid and     Advanced maternal age, primigravida, antepartum    Supervision of normal first pregnancy, antepartum - Primary     Other Visit Diagnoses       15 weeks gestation of pregnancy        Relevant Orders    URINALYSIS NONAUTO W/O SCOPE (OB URISTIX)              RTC in 4 weeks     Candidates    Low-dose aspirin 81 mg: Take 2 tablets by mouth daily starting at 12 weeks until 36 weeks      Selecting high risk women for prophylaxis -- The greatest benefit appears to be in women at moderate to high risk of developing the disease [10,13-20], in whom a 62 percent reduction of  preeclampsia occurred in the ASPRE trial [21]. We recommend low-dose aspirin prophylaxis for women at high risk for preeclampsia. There is no consensus on the exact criteria that confer high risk. It is reasonable to use the USPSTF high-risk criteria, which are also endorsed by the [facility] [employer] of Obstetricians and Gynecologists (ACOG) [22,23]. The incidence of preeclampsia is estimated to be at least 8 percent for pregnant women with any one of these high risk factors:    ? Previous pregnancy with preeclampsia, especially early onset and with an adverse outcome. ?Type 1 or 2 diabetes mellitus. ? Chronic hypertension. ?Multifetal gestation. ? Kidney disease. ? Autoimmune disease with potential vascular complications (antiphospholipid syndrome, systemic lupus erythematosus). We generally follow the USPSTF criteria and recommend low-dose aspirin for preeclampsia prevention to patients with two or more of the following moderate risk factors [22]:    ?Nulliparity. ?Obesity (body mass index >30 kg/m2). ? Family history of preeclampsia in mother or sister. ?Age ? 35 years. ?Sociodemographic characteristics (Black persons, lower income level [recognizing that these are not biological factors]). ? Personal risk factors (eg, previous pregnancy with low birth weight or small for gestational age infant, previous adverse pregnancy outcome [eg, stillbirth], interval >10 years between pregnancies). ?In vitro conception        Note to patient and family   The [facility] makes medical notes available to patients in the interest of transparency. However, please be advised that this is a medical document. It is intended as wamg-uw-lgsx communication. It is written and medical language may contain abbreviations or verbiage that are technical and unfamiliar. It may appear blunt or direct. Medical documents are intended to carry relevant information, facts as evident, and the clinical opinion of the practitioner.

## 2023-08-21 ENCOUNTER — ROUTINE PRENATAL (OUTPATIENT)
Dept: OBGYN CLINIC | Facility: CLINIC | Age: 33
End: 2023-08-21
Payer: COMMERCIAL

## 2023-08-21 ENCOUNTER — OFFICE VISIT (OUTPATIENT)
Dept: PERINATAL CARE | Facility: HOSPITAL | Age: 33
End: 2023-08-21
Attending: NURSE PRACTITIONER
Payer: COMMERCIAL

## 2023-08-21 ENCOUNTER — ULTRASOUND ENCOUNTER (OUTPATIENT)
Dept: PERINATAL CARE | Facility: HOSPITAL | Age: 33
End: 2023-08-21
Attending: OBSTETRICS & GYNECOLOGY
Payer: COMMERCIAL

## 2023-08-21 VITALS
HEART RATE: 74 BPM | WEIGHT: 134 LBS | HEIGHT: 60 IN | SYSTOLIC BLOOD PRESSURE: 109 MMHG | DIASTOLIC BLOOD PRESSURE: 68 MMHG | BODY MASS INDEX: 26.31 KG/M2

## 2023-08-21 VITALS
BODY MASS INDEX: 26 KG/M2 | SYSTOLIC BLOOD PRESSURE: 110 MMHG | HEART RATE: 80 BPM | WEIGHT: 134.38 LBS | DIASTOLIC BLOOD PRESSURE: 70 MMHG

## 2023-08-21 DIAGNOSIS — O09.529 AMA (ADVANCED MATERNAL AGE) MULTIGRAVIDA 35+: ICD-10-CM

## 2023-08-21 DIAGNOSIS — O09.522 MULTIGRAVIDA OF ADVANCED MATERNAL AGE IN SECOND TRIMESTER: ICD-10-CM

## 2023-08-21 DIAGNOSIS — O09.529 AMA (ADVANCED MATERNAL AGE) MULTIGRAVIDA 35+: Primary | ICD-10-CM

## 2023-08-21 DIAGNOSIS — Z34.00 SUPERVISION OF NORMAL FIRST PREGNANCY, ANTEPARTUM: Primary | ICD-10-CM

## 2023-08-21 PROCEDURE — 76811 OB US DETAILED SNGL FETUS: CPT | Performed by: OBSTETRICS & GYNECOLOGY

## 2023-08-21 RX ORDER — ASPIRIN 81 MG/1
162 TABLET ORAL DAILY
COMMUNITY

## 2023-08-21 NOTE — PROGRESS NOTES
ORAL  FM- flutters  Doing well  No complaints.  No LOF/VB/uctx  Genetic testing- NIPT completed, declines MS AFP  Anatomy Scan with MFM earlier today  ORAL 4 weeks

## 2023-09-01 ENCOUNTER — TELEPHONE (OUTPATIENT)
Dept: OBGYN CLINIC | Facility: CLINIC | Age: 33
End: 2023-09-01

## 2023-09-18 ENCOUNTER — ROUTINE PRENATAL (OUTPATIENT)
Dept: OBGYN CLINIC | Facility: CLINIC | Age: 33
End: 2023-09-18
Payer: COMMERCIAL

## 2023-09-18 VITALS
DIASTOLIC BLOOD PRESSURE: 70 MMHG | WEIGHT: 138.25 LBS | HEART RATE: 71 BPM | SYSTOLIC BLOOD PRESSURE: 110 MMHG | BODY MASS INDEX: 27 KG/M2

## 2023-09-18 DIAGNOSIS — Z34.00 SUPERVISION OF NORMAL FIRST PREGNANCY, ANTEPARTUM: Primary | ICD-10-CM

## 2023-09-19 ENCOUNTER — ROUTINE PRENATAL (OUTPATIENT)
Dept: OBGYN CLINIC | Facility: CLINIC | Age: 33
End: 2023-09-19
Payer: COMMERCIAL

## 2023-09-19 VITALS
HEART RATE: 80 BPM | DIASTOLIC BLOOD PRESSURE: 60 MMHG | BODY MASS INDEX: 27 KG/M2 | WEIGHT: 139.13 LBS | SYSTOLIC BLOOD PRESSURE: 100 MMHG

## 2023-09-19 DIAGNOSIS — R87.810 ASCUS WITH POSITIVE HIGH RISK HPV CERVICAL: Primary | ICD-10-CM

## 2023-09-19 DIAGNOSIS — R87.610 ASCUS WITH POSITIVE HIGH RISK HPV CERVICAL: Primary | ICD-10-CM

## 2023-09-19 PROCEDURE — 57452 EXAM OF CERVIX W/SCOPE: CPT | Performed by: OBSTETRICS & GYNECOLOGY

## 2023-09-19 PROCEDURE — 3074F SYST BP LT 130 MM HG: CPT | Performed by: OBSTETRICS & GYNECOLOGY

## 2023-09-19 PROCEDURE — 3078F DIAST BP <80 MM HG: CPT | Performed by: OBSTETRICS & GYNECOLOGY

## 2023-09-19 NOTE — PROCEDURES
Colposcopy Procedure Note    Diagnosis and Indication: ASCUS pos HPV, 24 week pregnancy    Procedure Details: The risks including infection and bleeding were explained to the patient and verbal informed consent obtained. Patient's last menstrual period was 04/03/2023 (exact date). Colposcopy performed using dilute acetic acid solution. Brewster filter utilized. Colposcopy was adequate with visualization of the entire transformation zone. No lesions, acetowhite epithelial or vascular changes noted.     Biopsy- none    Condition:  Stable    Complications:  None    Impression:  No evidence of dysplasia     Plan:  Recommend Pap/HPV postpartum    Diagnoses and all orders for this visit:    ASCUS with positive high risk HPV cervical  -     COLPOSCOPY, CERVIX INC UPPER/ADJACENT VAGINA (CPT=57452)

## 2023-09-19 NOTE — PROGRESS NOTES
Subjective:  26year old    Patient presents with:  Prenatal Care  Colposcopy    Patient presents due to first abnormal pap smear showing ASCUS positive HPV 2023. No previous history of dysplasia or abnormal Pap smears. Patient is sexually active with 1 partner for past 2 years not using condoms. No Tobacco use. Review of Systems:  Pertinent items are noted in the HPI. Objective:  /60   Pulse 80   Wt 139 lb 2 oz (63.1 kg)   LMP 2023 (Exact Date)     Physical Examination:  General appearance: Well dressed, well nourished in no apparent distress  Neurologic/Psychiatric: Alert and oriented to person, place and time, mood normal, affect appropriate  Pelvic:    External genitalia- Normal, Bartholin's, urethra, skeins glands normal   Vagina- No vaginal lesions, no discharge   Cervix- No lesions, long/closed, no cervical motion tenderness    FHT's 135    Assessment/Plan:  Abnormal Pap smear- check colposcopy. Counseled regarding cervical dysplasia and HPV. Safe sex practices discussed. Patient has had the Gardasil vaccine.     Diagnoses and all orders for this visit:    ASCUS with positive high risk HPV cervical  -     COLPOSCOPY, CERVIX INC UPPER/ADJACENT VAGINA (CPT=57452)

## 2023-09-27 ENCOUNTER — TELEPHONE (OUTPATIENT)
Dept: OBGYN CLINIC | Facility: CLINIC | Age: 33
End: 2023-09-27

## 2023-10-07 LAB — GLUCOSE, GESTATIONAL SCREEN (50G)-130 CUTOFF: 72 MG/DL

## 2023-10-17 ENCOUNTER — ROUTINE PRENATAL (OUTPATIENT)
Dept: OBGYN CLINIC | Facility: CLINIC | Age: 33
End: 2023-10-17
Payer: COMMERCIAL

## 2023-10-17 VITALS
DIASTOLIC BLOOD PRESSURE: 72 MMHG | SYSTOLIC BLOOD PRESSURE: 108 MMHG | HEART RATE: 90 BPM | WEIGHT: 138.5 LBS | BODY MASS INDEX: 27 KG/M2

## 2023-10-17 DIAGNOSIS — O09.519 ADVANCED MATERNAL AGE, PRIMIGRAVIDA, ANTEPARTUM: ICD-10-CM

## 2023-10-17 DIAGNOSIS — Z34.00 SUPERVISION OF NORMAL FIRST PREGNANCY, ANTEPARTUM: Primary | ICD-10-CM

## 2023-10-17 PROCEDURE — 3074F SYST BP LT 130 MM HG: CPT | Performed by: OBSTETRICS & GYNECOLOGY

## 2023-10-17 PROCEDURE — 3078F DIAST BP <80 MM HG: CPT | Performed by: OBSTETRICS & GYNECOLOGY

## 2023-10-17 NOTE — PROGRESS NOTES
ORAL  [id number]  GA: 28w1d   10/17/23  1441   BP: 108/72   Pulse: 90   Weight: 138 lb 8 oz (62.8 kg)       Doing well, +FM   Denies LOF/VB/uctx  Rh +, TDAP declined, EPDS 0  PTL and Fetal movement instructions given  Repeat HIV ordered and d/w patient   Recommend Pepcid for heart burn   Continue low dose ASA  D/w patient back pain and musculosketetal pains in pregnancy       Problem List Items Addressed This Visit          Gravid and     Advanced maternal age, primigravida, antepartum    Supervision of normal first pregnancy, antepartum - Primary    Relevant Orders    HIV-1/HIV-2 Single Assay [Q]       RTC in 2 wks      Note to patient and family   The [facility] makes medical notes available to patients in the interest of transparency. However, please be advised that this is a medical document. It is intended as jmbf-gz-dqph communication. It is written and medical language may contain abbreviations or verbiage that are technical and unfamiliar. It may appear blunt or direct. Medical documents are intended to carry relevant information, facts as evident, and the clinical opinion of the practitioner. Home Suture Removal Text: Patient was provided a home suture removal kit and will remove their sutures at home.  If they have any questions or difficulties they will call the office.

## 2023-10-30 ENCOUNTER — ROUTINE PRENATAL (OUTPATIENT)
Dept: OBGYN CLINIC | Facility: CLINIC | Age: 33
End: 2023-10-30

## 2023-10-30 VITALS
DIASTOLIC BLOOD PRESSURE: 78 MMHG | HEART RATE: 87 BPM | WEIGHT: 139.5 LBS | SYSTOLIC BLOOD PRESSURE: 104 MMHG | BODY MASS INDEX: 27 KG/M2

## 2023-10-30 DIAGNOSIS — Z3A.30 30 WEEKS GESTATION OF PREGNANCY: Primary | ICD-10-CM

## 2023-10-30 PROBLEM — Z28.21 TETANUS, DIPHTHERIA, AND ACELLULAR PERTUSSIS (TDAP) VACCINATION DECLINED: Status: ACTIVE | Noted: 2023-10-30

## 2023-10-30 PROCEDURE — 3074F SYST BP LT 130 MM HG: CPT | Performed by: STUDENT IN AN ORGANIZED HEALTH CARE EDUCATION/TRAINING PROGRAM

## 2023-10-30 PROCEDURE — 3078F DIAST BP <80 MM HG: CPT | Performed by: STUDENT IN AN ORGANIZED HEALTH CARE EDUCATION/TRAINING PROGRAM

## 2023-10-30 NOTE — PROGRESS NOTES
Return OB Visit 28-33 WGA      GA: 30w0d   10/30/23  1513   BP: 104/78   Pulse: 87   Weight: 139 lb 8 oz (63.3 kg)       Doing well, +FM   Denies LOF/VB/uctx  Rh positive, TDAP declined, EPDS Depression Scale Total: 0 (2023 10:57 AM)   PTL and Fetal movement instructions given  3rd T HIV already ordered       Patient Active Problem List    Tetanus, diphtheria, and acellular pertussis (Tdap) vaccination declined      Supervision of normal first pregnancy, antepartum            Low dose ASA      ASCUS with positive high risk HPV cervical            2023            [ x ] Check colpo- no evidence dysplasia. Check            Pap/HPV postpartum      Hyperemesis gravidarum      Advanced maternal age, primigravida, antepartum            [  ] BASA            [ x ] level 2/MFM            [  ] Growth US 26 weeks            [  ] NST 36 weeks          RTC in 2 wks      Note to patient and family   The [place] [address] makes medical notes available to patients in the interest of transparency. However, please be advised that this is a medical document. It is intended as jxza-pm-ysio communication. It is written and medical language may contain abbreviations or verbiage that are technical and unfamiliar. It may appear blunt or direct. Medical documents are intended to carry relevant information, facts as evident, and the clinical opinion of the practitioner.       Sheela Olmstead MD

## 2023-11-15 ENCOUNTER — ROUTINE PRENATAL (OUTPATIENT)
Dept: OBGYN CLINIC | Facility: CLINIC | Age: 33
End: 2023-11-15
Payer: COMMERCIAL

## 2023-11-15 ENCOUNTER — ULTRASOUND ENCOUNTER (OUTPATIENT)
Dept: OBGYN CLINIC | Facility: CLINIC | Age: 33
End: 2023-11-15
Payer: COMMERCIAL

## 2023-11-15 VITALS
DIASTOLIC BLOOD PRESSURE: 70 MMHG | BODY MASS INDEX: 27 KG/M2 | SYSTOLIC BLOOD PRESSURE: 106 MMHG | WEIGHT: 139.5 LBS | HEART RATE: 85 BPM

## 2023-11-15 DIAGNOSIS — Z34.00 SUPERVISION OF NORMAL FIRST PREGNANCY, ANTEPARTUM: ICD-10-CM

## 2023-11-15 DIAGNOSIS — O09.519 ADVANCED MATERNAL AGE, PRIMIGRAVIDA, ANTEPARTUM: Primary | ICD-10-CM

## 2023-11-15 PROBLEM — R55 SYNCOPAL EPISODES: Status: ACTIVE | Noted: 2023-11-15

## 2023-11-15 PROCEDURE — 3078F DIAST BP <80 MM HG: CPT | Performed by: OBSTETRICS & GYNECOLOGY

## 2023-11-15 PROCEDURE — 3074F SYST BP LT 130 MM HG: CPT | Performed by: OBSTETRICS & GYNECOLOGY

## 2023-11-15 PROCEDURE — 76816 OB US FOLLOW-UP PER FETUS: CPT | Performed by: OBSTETRICS & GYNECOLOGY

## 2023-11-15 NOTE — PROGRESS NOTES
[id number] 32w2d seen for routine OB visit. Denies ctx, lof, vb. Reports good FM. Patient Active Problem List   Diagnosis    Advanced maternal age, primigravida, antepartum    Hyperemesis gravidarum    ASCUS with positive high risk HPV cervical    Supervision of normal first pregnancy, antepartum    Tetanus, diphtheria, and acellular pertussis (Tdap) vaccination declined        TW lb 8 oz (5.67 kg)   Depression Scale Total: 0 (11/15/2023  2:25 PM)      Gen: AAOx3, NAD  Resp: breathing comfortably  Abdomen: gravid, soft, nontender  Ext: nontender, no edema    US: EFW 1785g (34%), breech, , posterior placenta, CHANTELL 14.8cm. Plan:  - reviewed US findings - appropriate growth and CHANTELL. Weekly NST at 36wks. - declines all vaccines today  - reminded to complete HIV and CBC  - return precautions reviewed    RTO in 2 week(s).

## 2023-11-25 LAB
ABSOLUTE BASOPHILS: 23 CELLS/UL (ref 0–200)
ABSOLUTE EOSINOPHILS: 39 CELLS/UL (ref 15–500)
ABSOLUTE LYMPHOCYTES: 1340 CELLS/UL (ref 850–3900)
ABSOLUTE MONOCYTES: 400 CELLS/UL (ref 200–950)
ABSOLUTE NEUTROPHILS: 5898 CELLS/UL (ref 1500–7800)
BASOPHILS: 0.3 %
EOSINOPHILS: 0.5 %
HEMATOCRIT: 36.6 % (ref 35–45)
HEMOGLOBIN: 12 G/DL (ref 11.7–15.5)
LYMPHOCYTES: 17.4 %
MCH: 27.8 PG (ref 27–33)
MCHC: 32.8 G/DL (ref 32–36)
MCV: 84.9 FL (ref 80–100)
MONOCYTES: 5.2 %
MPV: 12.7 FL (ref 7.5–12.5)
NEUTROPHILS: 76.6 %
PLATELET COUNT: 192 THOUSAND/UL (ref 140–400)
RDW: 13.4 % (ref 11–15)
RED BLOOD CELL COUNT: 4.31 MILLION/UL (ref 3.8–5.1)
WHITE BLOOD CELL COUNT: 7.7 THOUSAND/UL (ref 3.8–10.8)

## 2023-11-29 ENCOUNTER — ROUTINE PRENATAL (OUTPATIENT)
Dept: OBGYN CLINIC | Facility: CLINIC | Age: 33
End: 2023-11-29
Payer: COMMERCIAL

## 2023-11-29 VITALS — SYSTOLIC BLOOD PRESSURE: 102 MMHG | WEIGHT: 143 LBS | DIASTOLIC BLOOD PRESSURE: 68 MMHG | BODY MASS INDEX: 28 KG/M2

## 2023-11-29 DIAGNOSIS — O09.519 ADVANCED MATERNAL AGE, PRIMIGRAVIDA, ANTEPARTUM: ICD-10-CM

## 2023-11-29 DIAGNOSIS — Z34.00 SUPERVISION OF NORMAL FIRST PREGNANCY, ANTEPARTUM: Primary | ICD-10-CM

## 2023-11-29 PROCEDURE — 3074F SYST BP LT 130 MM HG: CPT | Performed by: OBSTETRICS & GYNECOLOGY

## 2023-11-29 PROCEDURE — 3078F DIAST BP <80 MM HG: CPT | Performed by: OBSTETRICS & GYNECOLOGY

## 2023-11-29 NOTE — PROGRESS NOTES
ORAL  [id number]  GA: 34w2d  Vitals:    23 1349   BP: 102/68   Weight: 143 lb (64.9 kg)       Doing well, +FM  Denies LOF/VB/uctx  Rh +, TDAP declined, EPDS 0  PTL and Fetal movement instructions given  Fetal malpresentation, breech. Patient has been working on exercises. Recommend chiropractor. Patient declines ECV. Patient would prefer to be scheduled for primary  section if fetal malpresentation persistent. Recommend to check at follow-up appointment. Problem List Items Addressed This Visit          Gravid and     Advanced maternal age, primigravida, antepartum    Supervision of normal first pregnancy, antepartum - Primary    Breech presentation       RTC in 2 wks w/ NST      Note to patient and family   The [facility] makes medical notes available to patients in the interest of transparency. However, please be advised that this is a medical document. It is intended as otbu-me-uxln communication. It is written and medical language may contain abbreviations or verbiage that are technical and unfamiliar. It may appear blunt or direct. Medical documents are intended to carry relevant information, facts as evident, and the clinical opinion of the practitioner.

## 2023-12-14 ENCOUNTER — ROUTINE PRENATAL (OUTPATIENT)
Dept: OBGYN CLINIC | Facility: CLINIC | Age: 33
End: 2023-12-14
Payer: COMMERCIAL

## 2023-12-14 VITALS
SYSTOLIC BLOOD PRESSURE: 102 MMHG | BODY MASS INDEX: 28 KG/M2 | HEART RATE: 85 BPM | WEIGHT: 144.81 LBS | DIASTOLIC BLOOD PRESSURE: 70 MMHG

## 2023-12-14 DIAGNOSIS — Z36.9 ANTENATAL SCREENING ENCOUNTER: ICD-10-CM

## 2023-12-14 DIAGNOSIS — Z36.9 ENCOUNTER FOR ANTENATAL SCREENING OF MOTHER: ICD-10-CM

## 2023-12-14 DIAGNOSIS — N90.89 VULVAR SKIN TAG: ICD-10-CM

## 2023-12-14 DIAGNOSIS — Z34.90 PRENATAL CARE, ANTEPARTUM: Primary | ICD-10-CM

## 2023-12-14 PROCEDURE — 87081 CULTURE SCREEN ONLY: CPT | Performed by: OBSTETRICS & GYNECOLOGY

## 2023-12-14 PROCEDURE — 87150 DNA/RNA AMPLIFIED PROBE: CPT | Performed by: OBSTETRICS & GYNECOLOGY

## 2023-12-14 PROCEDURE — 3074F SYST BP LT 130 MM HG: CPT | Performed by: OBSTETRICS & GYNECOLOGY

## 2023-12-14 PROCEDURE — 3078F DIAST BP <80 MM HG: CPT | Performed by: OBSTETRICS & GYNECOLOGY

## 2023-12-14 NOTE — PROGRESS NOTES
Chief Complaint   Patient presents with    Prenatal Care     ORAL     Routine prenatal visit. Patient without complaints. Good fetal movement. Patient denies any bleeding, leaking fluid, cramping, or regular uterine contractions. SVE: cl/th/-3  vtx    Physical Exam  Genitourinary:           Assessment/Plan:  36w3d doing well  GBBS done  Breech resolved  3 lesions on left side of vulva present for past 2 weeks. Condyloma versus skin tags- check vulvar biopsy. AMA- start weekly NST  Kick counts reviewed. Reviewed labor signs and symptoms. Diagnoses and all orders for this visit:    Prenatal care, antepartum     screening encounter    Encounter for  screening of mother  -     Group B Strep PCR    Vulvar skin tag      Return in about 1 week (around 2023) for Routine Prenatal Visit, NST, vulvar biopsy next wk.

## 2023-12-16 PROBLEM — O98.819 GROUP B STREPTOCOCCAL INFECTION IN PREGNANCY: Status: ACTIVE | Noted: 2023-12-16

## 2023-12-16 PROBLEM — O98.819 GROUP B STREPTOCOCCAL INFECTION IN PREGNANCY (HCC): Status: ACTIVE | Noted: 2023-12-16

## 2023-12-16 PROBLEM — B95.1 GROUP B STREPTOCOCCAL INFECTION IN PREGNANCY (HCC): Status: ACTIVE | Noted: 2023-12-16

## 2023-12-16 PROBLEM — B95.1 GROUP B STREPTOCOCCAL INFECTION IN PREGNANCY: Status: ACTIVE | Noted: 2023-12-16

## 2023-12-16 LAB — GROUP B STREP BY PCR FOR PCR OVT: POSITIVE

## 2023-12-19 ENCOUNTER — TELEPHONE (OUTPATIENT)
Dept: OBGYN CLINIC | Facility: CLINIC | Age: 33
End: 2023-12-19

## 2023-12-19 NOTE — TELEPHONE ENCOUNTER
Received breast pump orders from [place]. Dr. Nik Brenner signed and I faxed back.  Copy in plfd

## 2023-12-20 ENCOUNTER — ROUTINE PRENATAL (OUTPATIENT)
Dept: OBGYN CLINIC | Facility: CLINIC | Age: 33
End: 2023-12-20
Payer: COMMERCIAL

## 2023-12-20 VITALS
DIASTOLIC BLOOD PRESSURE: 70 MMHG | WEIGHT: 144.13 LBS | BODY MASS INDEX: 28.3 KG/M2 | HEART RATE: 71 BPM | SYSTOLIC BLOOD PRESSURE: 110 MMHG | HEIGHT: 60 IN

## 2023-12-20 DIAGNOSIS — L98.9 SKIN LESION: Primary | ICD-10-CM

## 2023-12-20 PROCEDURE — 3074F SYST BP LT 130 MM HG: CPT | Performed by: OBSTETRICS & GYNECOLOGY

## 2023-12-20 PROCEDURE — 56501 DESTROY VULVA LESIONS SIM: CPT | Performed by: OBSTETRICS & GYNECOLOGY

## 2023-12-20 PROCEDURE — 3008F BODY MASS INDEX DOCD: CPT | Performed by: OBSTETRICS & GYNECOLOGY

## 2023-12-20 PROCEDURE — 3078F DIAST BP <80 MM HG: CPT | Performed by: OBSTETRICS & GYNECOLOGY

## 2023-12-20 PROCEDURE — 88305 TISSUE EXAM BY PATHOLOGIST: CPT | Performed by: OBSTETRICS & GYNECOLOGY

## 2023-12-20 PROCEDURE — 57105 BIOPSY VAGINAL MUCOSA XTNSV: CPT | Performed by: OBSTETRICS & GYNECOLOGY

## 2023-12-20 NOTE — PROCEDURES
Biopsy Procedure Note    Post-operative Diagnosis: Left vulvar lesion. Procedure Details   The risks (including infection, bleeding, pain, and scarring) and benefits of the procedure were explained to the patient and verbal informed consent was obtained. The patient was noted to have three skin lesions on the left vulva. Larger superior lesion measured about 4 mm. Two small lesions lower on the vulva measured 1-2 mm. Appearance suggestive of condyloma versus skin tags. Decision made to excise the larger lesion and treat the two smaller lesions with TCA. The area was prepped with betadine solution. The area was infiltrated with 2 cc of 1% lidocaine solution. Using fine scissors, the entire lesion was excised with adequate margins. Silver nitrate was applied for hemostasis. 1 stitch of 4.0 vicryl was placed to re-approximate the skin edges, with good re-approximation noted. Condition:  Stable    Complications:  None    Plan:  The patient was advised to call for any fever, worsening discharge/redness/swelling, moderate/severe pain or moderate/heavy bleeding. She was advised to use ibuprofen as needed for mild pain. Follow up as needed.     Diagnoses and all orders for this visit:    Skin lesion  -     Specimen to Pathology Tissue

## 2023-12-20 NOTE — PROCEDURES
TCA Application Procedure Note    Indication and Diagnosis: Two left vulvar skin lesions suspicious for condyloma    Procedure Details   Reviewed risks and potential complications of procedure including infection and scarring. Three lesions noted on left vulva. Larger superior lesion excised (see separate procedure note). Two small 1-2 mm lesions lower on left vulva treated. TCA applied to the lesions without complications. Vaseline applied to surrounding tissues. Condition:  Stable    Complications:  None    Plan:  Wash vulva with soap and water after 6-8 hours. Signs and symptoms of infection reviewed.     Diagnoses and all orders for this visit:    Skin lesion  -     Specimen to Pathology Tissue

## 2023-12-21 ENCOUNTER — ROUTINE PRENATAL (OUTPATIENT)
Dept: OBGYN CLINIC | Facility: CLINIC | Age: 33
End: 2023-12-21
Payer: COMMERCIAL

## 2023-12-21 ENCOUNTER — APPOINTMENT (OUTPATIENT)
Dept: OBGYN CLINIC | Facility: CLINIC | Age: 33
End: 2023-12-21
Payer: COMMERCIAL

## 2023-12-21 VITALS
WEIGHT: 145.19 LBS | BODY MASS INDEX: 28 KG/M2 | DIASTOLIC BLOOD PRESSURE: 72 MMHG | HEART RATE: 77 BPM | SYSTOLIC BLOOD PRESSURE: 108 MMHG

## 2023-12-21 DIAGNOSIS — Z3A.37 37 WEEKS GESTATION OF PREGNANCY: Primary | ICD-10-CM

## 2023-12-21 NOTE — PROGRESS NOTES
RETURN OB 35-41 WGA      GA: 37w3d  Vitals:    23 1457   BP: 108/72   Pulse: 77   Weight: 145 lb 3.2 oz (65.9 kg)       Doing well, +FM  Denies LOF/VB/uctx  Mode of delivery:   anticipated  SVE deferred   GBS positive  Fetal movement count given  Labor precautions provided       Patient Active Problem List    Diagnosis    Group B streptococcal infection in pregnancy     2023      Vulvar skin tag     36 wks. Versus HPV. Check bx      Syncopal episodes    Tetanus, diphtheria, and acellular pertussis (Tdap) vaccination declined    Supervision of normal first pregnancy, antepartum     Low dose ASA      ASCUS with positive high risk HPV cervical     2023  [ x ] Check colpo- no evidence dysplasia. Check Pap/HPV postpartum      Hyperemesis gravidarum    Advanced maternal age, primigravida, antepartum     [  ] BASA  [ x ] level 2/MFM  [ x ] Growth US 32 weeks - EFW 34%  [  ] NST 36 weeks             RTC 1 week    NST  reactive and reassuring           Note to patient and family   The [facility] makes medical notes available to patients in the interest of transparency. However, please be advised that this is a medical document. It is intended as grjv-ba-lsmh communication. It is written and medical language may contain abbreviations or verbiage that are technical and unfamiliar. It may appear blunt or direct. Medical documents are intended to carry relevant information, facts as evident, and the clinical opinion of the practitioner.     Radha Roa MD

## 2023-12-28 ENCOUNTER — APPOINTMENT (OUTPATIENT)
Dept: OBGYN CLINIC | Facility: CLINIC | Age: 33
End: 2023-12-28
Payer: COMMERCIAL

## 2023-12-28 ENCOUNTER — ROUTINE PRENATAL (OUTPATIENT)
Dept: OBGYN CLINIC | Facility: CLINIC | Age: 33
End: 2023-12-28
Payer: COMMERCIAL

## 2023-12-28 VITALS
SYSTOLIC BLOOD PRESSURE: 104 MMHG | WEIGHT: 145.13 LBS | DIASTOLIC BLOOD PRESSURE: 72 MMHG | HEART RATE: 83 BPM | BODY MASS INDEX: 28 KG/M2

## 2023-12-28 DIAGNOSIS — O09.519 ADVANCED MATERNAL AGE, PRIMIGRAVIDA, ANTEPARTUM: ICD-10-CM

## 2023-12-28 DIAGNOSIS — N90.89 VULVAR SKIN TAG: ICD-10-CM

## 2023-12-28 DIAGNOSIS — Z34.00 SUPERVISION OF NORMAL FIRST PREGNANCY, ANTEPARTUM: Primary | ICD-10-CM

## 2023-12-28 DIAGNOSIS — O98.819 GROUP B STREPTOCOCCAL INFECTION IN PREGNANCY: ICD-10-CM

## 2023-12-28 DIAGNOSIS — B95.1 GROUP B STREPTOCOCCAL INFECTION IN PREGNANCY: ICD-10-CM

## 2023-12-28 PROCEDURE — 59025 FETAL NON-STRESS TEST: CPT | Performed by: OBSTETRICS & GYNECOLOGY

## 2023-12-28 PROCEDURE — 3078F DIAST BP <80 MM HG: CPT | Performed by: OBSTETRICS & GYNECOLOGY

## 2023-12-28 PROCEDURE — 3074F SYST BP LT 130 MM HG: CPT | Performed by: OBSTETRICS & GYNECOLOGY

## 2023-12-28 NOTE — PROGRESS NOTES
ORAL  [id number]  GA: 38w3d  Vitals:    23 1335   BP: 104/72   Pulse: 83   Weight: 145 lb 2 oz (65.8 kg)       Doing well, +FM  Denies LOF/VB/uctx  Mode of delivery:  anticipated  SVE /-6, cephalic by limited BSUS   GBS +  Fetal movement count given  Labor precautions provided     Assessment:     Gonzales Bowman is a 26year old  at 38w3d who presents for routine OB visit. Overall doing well. Problem List Items Addressed This Visit          Genitourinary and Reproductive    Vulvar skin tag       Gravid and     Advanced maternal age, primigravida, antepartum - Primary    Relevant Orders    Fetal Non Stress Test [54409] (Completed)    Supervision of normal first pregnancy, antepartum    Group B streptococcal infection in pregnancy           Plan:     Patient Active Problem List    Diagnosis    Group B streptococcal infection in pregnancy     2023      Vulvar skin tag     36 wks. Versus HPV. Check bx      Syncopal episodes    Tetanus, diphtheria, and acellular pertussis (Tdap) vaccination declined    Supervision of normal first pregnancy, antepartum     Low dose ASA      ASCUS with positive high risk HPV cervical     2023  [ x ] Check colpo- no evidence dysplasia. Check Pap/HPV postpartum      Hyperemesis gravidarum    Advanced maternal age, primigravida, antepartum     [  ] Viridiana Deng  [ x ] level 2/MFM  [ x ] Growth US 32 weeks - EFW 34%  [  ] NST 36 weeks         - continue routine prenatal care   - labor and rupture of membrane precautions provided  - request IOL at 40wks+    Return to clinic in 1 week for ORAL visit w/ NST     NST reactive and reassuring           Note to patient and family   The [facility] makes medical notes available to patients in the interest of transparency. However, please be advised that this is a medical document. It is intended as afwz-pn-wxhb communication.   It is written and medical language may contain abbreviations or verbiage that are technical and unfamiliar. It may appear blunt or direct. Medical documents are intended to carry relevant information, facts as evident, and the clinical opinion of the practitioner.

## 2023-12-28 NOTE — TELEPHONE ENCOUNTER
----- Message from Jane Prescott MD sent at 12/28/2023  2:32 PM CST -----  Regarding: please schedule IOL  Request IOL at/between this GA: 40 wks  Estimated Date of Delivery: 1/8/24  Indication for IOL:  post dates   Time of appointment: PM  Cervical ripening with cytotec: yes  IOL with pitocin: yes, per protocol   OB history/complications: AMA, GBS+

## 2024-01-04 PROBLEM — A63.0 CONDYLOMA ACUMINATA: Status: ACTIVE | Noted: 2024-01-04

## 2024-01-04 NOTE — PROCEDURES
TCA Application Procedure Note    Indication and Diagnosis: Vulvar Condyloma    Procedure Details   Reviewed risks and potential complications of procedure including infection and scarring.  2 mm lesion noted at bottom of left vulva.  TCA applied to the lesions without complications.  Vaseline applied to surrounding tissues.      Condition:  Stable    Complications:  None    Plan:  Wash vulva with soap and water after 6-8 hours.  Signs and symptoms of infection reviewed.    Diagnoses and all orders for this visit:    Prenatal care, antepartum    Advanced maternal age, primigravida, antepartum  -     FETAL NON-STRESS TEST EMG ONLY 83956; Future    Condyloma acuminata        Return for Postpartum Visit.

## 2024-01-04 NOTE — PROGRESS NOTES
Chief Complaint   Patient presents with    Prenatal Care     ORAL/NST      Routine prenatal visit without complaints.  Patient denies any bleeding, leaking fluid, cramping, or regular uterine contractions.  Good fetal movement.  SVE: cl/th/-3 vertex  NST: see report.  Reactive  Assessment/Plan:  39w3d doing well  GBS pos  AMA- weekly NST.  IOL cytotec 1/10/24 9 pm  Condyloma/JUSTINO 2.  Importance of regular follow up emphasized.  Follow up 3 and 6 months postpartum.  Additional small lesion on bottom of left vulva treated today with TCA  Kick counts reviewed.  Reviewed labor signs and symptoms.  Diagnoses and all orders for this visit:    Prenatal care, antepartum    Advanced maternal age, primigravida, antepartum  -     FETAL NON-STRESS TEST EMG ONLY [address]15; Future       Return for Postpartum Visit.

## 2024-01-07 ENCOUNTER — HOSPITAL ENCOUNTER (INPATIENT)
Facility: HOSPITAL | Age: 34
LOS: 2 days | Discharge: HOME | End: 2024-01-09
Attending: OBSTETRICS & GYNECOLOGY | Admitting: OBSTETRICS & GYNECOLOGY
Payer: COMMERCIAL

## 2024-01-07 PROBLEM — Z34.90 PREGNANCY: Status: ACTIVE | Noted: 2024-01-07

## 2024-01-07 PROBLEM — Z34.90 PREGNANCY (HCC): Status: ACTIVE | Noted: 2024-01-07

## 2024-01-07 LAB
ANTIBODY SCREEN: NEGATIVE
BASOPHILS # BLD AUTO: 0.03 X10(3) UL (ref 0–0.2)
BASOPHILS NFR BLD AUTO: 0.3 %
EOSINOPHIL # BLD AUTO: 0.06 X10(3) UL (ref 0–0.7)
EOSINOPHIL NFR BLD AUTO: 0.7 %
ERYTHROCYTE [DISTWIDTH] IN BLOOD BY AUTOMATED COUNT: 13.6 %
HCT VFR BLD AUTO: 38.3 %
HGB BLD-MCNC: 12.9 G/DL
IMM GRANULOCYTES # BLD AUTO: 0.03 X10(3) UL (ref 0–1)
IMM GRANULOCYTES NFR BLD: 0.3 %
LYMPHOCYTES # BLD AUTO: 1.7 X10(3) UL (ref 1–4)
LYMPHOCYTES NFR BLD AUTO: 19.1 %
MCH RBC QN AUTO: 27.5 PG (ref 26–34)
MCHC RBC AUTO-ENTMCNC: 33.7 G/DL (ref 31–37)
MCV RBC AUTO: 81.7 FL
MONOCYTES # BLD AUTO: 0.57 X10(3) UL (ref 0.1–1)
MONOCYTES NFR BLD AUTO: 6.4 %
NEUTROPHILS # BLD AUTO: 6.49 X10 (3) UL (ref 1.5–7.7)
NEUTROPHILS # BLD AUTO: 6.49 X10(3) UL (ref 1.5–7.7)
NEUTROPHILS NFR BLD AUTO: 73.2 %
PLATELET # BLD AUTO: 175 10(3)UL (ref 150–450)
RBC # BLD AUTO: 4.69 X10(6)UL
RH BLOOD TYPE: POSITIVE
T PALLIDUM AB SER QL IA: NONREACTIVE
WBC # BLD AUTO: 8.9 X10(3) UL (ref 4–11)

## 2024-01-07 PROCEDURE — 59410 OBSTETRICAL CARE: CPT | Performed by: OBSTETRICS & GYNECOLOGY

## 2024-01-07 PROCEDURE — 0KQM0ZZ REPAIR PERINEUM MUSCLE, OPEN APPROACH: ICD-10-PCS | Performed by: OBSTETRICS & GYNECOLOGY

## 2024-01-07 PROCEDURE — 10907ZC DRAINAGE OF AMNIOTIC FLUID, THERAPEUTIC FROM PRODUCTS OF CONCEPTION, VIA NATURAL OR ARTIFICIAL OPENING: ICD-10-PCS | Performed by: OBSTETRICS & GYNECOLOGY

## 2024-01-07 RX ORDER — CEFAZOLIN SODIUM/WATER 2 G/20 ML
SYRINGE (ML) INTRAVENOUS
Status: COMPLETED
Start: 2024-01-07 | End: 2024-01-07

## 2024-01-07 RX ORDER — ACETAMINOPHEN 500 MG
1000 TABLET ORAL EVERY 6 HOURS PRN
Status: DISCONTINUED | OUTPATIENT
Start: 2024-01-07 | End: 2024-01-09

## 2024-01-07 RX ORDER — CHOLECALCIFEROL (VITAMIN D3) 25 MCG
1 TABLET,CHEWABLE ORAL DAILY
Status: DISCONTINUED | OUTPATIENT
Start: 2024-01-07 | End: 2024-01-09

## 2024-01-07 RX ORDER — IBUPROFEN 600 MG/1
600 TABLET ORAL EVERY 6 HOURS
Status: DISCONTINUED | OUTPATIENT
Start: 2024-01-07 | End: 2024-01-09

## 2024-01-07 RX ORDER — CEFAZOLIN SODIUM/WATER 2 G/20 ML
2 SYRINGE (ML) INTRAVENOUS ONCE
Status: COMPLETED | OUTPATIENT
Start: 2024-01-07 | End: 2024-01-07

## 2024-01-07 RX ORDER — TERBUTALINE SULFATE 1 MG/ML
0.25 INJECTION, SOLUTION SUBCUTANEOUS AS NEEDED
Status: DISCONTINUED | OUTPATIENT
Start: 2024-01-07 | End: 2024-01-07

## 2024-01-07 RX ORDER — BISACODYL 10 MG
10 SUPPOSITORY, RECTAL RECTAL ONCE AS NEEDED
Status: DISCONTINUED | OUTPATIENT
Start: 2024-01-07 | End: 2024-01-09

## 2024-01-07 RX ORDER — DOCUSATE SODIUM 100 MG/1
100 CAPSULE, LIQUID FILLED ORAL
Status: DISCONTINUED | OUTPATIENT
Start: 2024-01-07 | End: 2024-01-09

## 2024-01-07 RX ORDER — ONDANSETRON 2 MG/ML
4 INJECTION INTRAMUSCULAR; INTRAVENOUS EVERY 6 HOURS PRN
Status: DISCONTINUED | OUTPATIENT
Start: 2024-01-07 | End: 2024-01-07

## 2024-01-07 RX ORDER — ACETAMINOPHEN 500 MG
500 TABLET ORAL EVERY 6 HOURS PRN
Status: DISCONTINUED | OUTPATIENT
Start: 2024-01-07 | End: 2024-01-07

## 2024-01-07 RX ORDER — HYDROMORPHONE HYDROCHLORIDE 1 MG/ML
1 INJECTION, SOLUTION INTRAMUSCULAR; INTRAVENOUS; SUBCUTANEOUS EVERY 2 HOUR PRN
Status: DISCONTINUED | OUTPATIENT
Start: 2024-01-07 | End: 2024-01-07

## 2024-01-07 RX ORDER — DEXTROSE, SODIUM CHLORIDE, SODIUM LACTATE, POTASSIUM CHLORIDE, AND CALCIUM CHLORIDE 5; .6; .31; .03; .02 G/100ML; G/100ML; G/100ML; G/100ML; G/100ML
INJECTION, SOLUTION INTRAVENOUS AS NEEDED
Status: DISCONTINUED | OUTPATIENT
Start: 2024-01-07 | End: 2024-01-07

## 2024-01-07 RX ORDER — BUPIVACAINE HCL/0.9 % NACL/PF 0.25 %
5 PLASTIC BAG, INJECTION (ML) EPIDURAL AS NEEDED
Status: DISCONTINUED | OUTPATIENT
Start: 2024-01-07 | End: 2024-01-07

## 2024-01-07 RX ORDER — SIMETHICONE 80 MG
80 TABLET,CHEWABLE ORAL 3 TIMES DAILY PRN
Status: DISCONTINUED | OUTPATIENT
Start: 2024-01-07 | End: 2024-01-09

## 2024-01-07 RX ORDER — SODIUM CHLORIDE, SODIUM LACTATE, POTASSIUM CHLORIDE, CALCIUM CHLORIDE 600; 310; 30; 20 MG/100ML; MG/100ML; MG/100ML; MG/100ML
INJECTION, SOLUTION INTRAVENOUS CONTINUOUS
Status: DISCONTINUED | OUTPATIENT
Start: 2024-01-07 | End: 2024-01-07

## 2024-01-07 RX ORDER — IBUPROFEN 600 MG/1
600 TABLET ORAL ONCE AS NEEDED
Status: COMPLETED | OUTPATIENT
Start: 2024-01-07 | End: 2024-01-07

## 2024-01-07 RX ORDER — NALBUPHINE HYDROCHLORIDE 10 MG/ML
2.5 INJECTION, SOLUTION INTRAMUSCULAR; INTRAVENOUS; SUBCUTANEOUS
Status: DISCONTINUED | OUTPATIENT
Start: 2024-01-07 | End: 2024-01-07

## 2024-01-07 RX ORDER — ACETAMINOPHEN 500 MG
1000 TABLET ORAL EVERY 6 HOURS PRN
Status: DISCONTINUED | OUTPATIENT
Start: 2024-01-07 | End: 2024-01-07

## 2024-01-07 RX ORDER — ACETAMINOPHEN 500 MG
500 TABLET ORAL EVERY 6 HOURS PRN
Status: DISCONTINUED | OUTPATIENT
Start: 2024-01-07 | End: 2024-01-09

## 2024-01-07 RX ORDER — CITRIC ACID/SODIUM CITRATE 334-500MG
30 SOLUTION, ORAL ORAL AS NEEDED
Status: DISCONTINUED | OUTPATIENT
Start: 2024-01-07 | End: 2024-01-07

## 2024-01-07 NOTE — PROGRESS NOTES
Patient up to bathroom with assist x 2.  Voided 75. Patient transferred to mother/baby room 2212 per wheelchair in stable condition with baby and personal belongings.  Accompanied by significant other and staff.  Report given to mother/baby RN.

## 2024-01-07 NOTE — L&D DELIVERY NOTE
Zaid, Girl [[id number]]      Labor Events     labor?: No   steroids?: None  Antibiotics received during labor?: Yes  Antibiotics (enter # doses in comment): other (Comment: Ancef)  Rupture date/time: 2024     Rupture type: AROM  Fluid color: Clear  Labor type: Spontaneous Onset of Labor  Augmentation: Oxytocin, AROM  Indications for augmentation: Ineffective Contraction Pattern  Intrapartum & labor complications: Group B beta strep +       Labor Event Times    Labor onset date/time: 2024  Dilation complete date/time: 2024  Start pushing date/time: 2024       [place] Presentation    Presentation: Vertex  Position: Right Occiput Anterior       Operative Delivery    Operative Vaginal Delivery: No                      Shoulder Dystocia    Shoulder Dystocia: No             Anesthesia    Method: Epidural               Delivery      Delivery date/time:  24 13:12:25   Delivery type: Normal spontaneous vaginal delivery    Details:     Delivery location: delivery room       Delivery Providers    Delivering Clinician: Jere Schmid MD   Delivery personnel:  Provider Role   Frances Vaca RN Baby Nurse   Yesenia Chaparro RN Delivery Nurse             Cord    Vessels: 3 Vessels  Complications: None  Timed cord clamping: Yes  Time in sec: 30  Cord blood disposition: to lab  Gases sent?: No        Measurements      Weight: 3060 g 6 lb 11.9 oz Length: 48.3 cm     Head circum.: 33 cm Chest circum.: 30 cm          Abdominal circum.: 29 cm           Placenta    Date/time: 2024 1315  Removal: Spontaneous  Appearance: Intact  Disposition: held for future pathology       Apgars    Living status: Living   Apgar Scoring Key:    0 1 2    Skin color Blue or pale Acrocyanotic Completely pink    Heart rate Absent <100 bpm >100 bpm    Reflex irritability No response Grimace Cry or active withdrawal    Muscle tone Limp Some flexion Active motion    Respiratory  effort Absent Weak cry; hypoventilation Good, crying              1 Minute:  5 Minute:  10 Minute:  15 Minute:  20 Minute:      Skin color: 0  1       Heart rate: 2  2       Reflex irritablity: 2  2       Muscle tone: 2  2       Respiratory effort: 2  2       Total: 8  9          Apgars assigned by: DILIP   disposition: with mother       Skin to Skin    No data filed       Vaginal Count    Initial count RN: Yesenia Chaparro RN  Initial count Tech: Stoddard, Nory   Sponges   Sharps    Initial counts 11   0    Final counts 11   3    Final count RN: Yesenia Chaparro RN  Final count MD: Jere Schmid MD       Delivery (Maternal)    Episiotomy: None  Perineal lacerations: None, 2nd      Vaginal laceration?: Yes Repaired?: Yes     Cervical laceration?: No    Clitoral laceration?: No                [facility]  Delivery Note    Dasia Mar Patient Status:  Inpatient    1990 MRN [id number]   Location [facility] LABOR & DELIVERY Attending Jere Schmid MD   Hosp Day # 0 PCP Liliane Rahman MD     Date of Delivery: 24    Pre Op Diagnosis:  IUP at Term, normal labor    Post Op Diagnosis: Same - delivered    Procedure: Normal Spontaneous Vaginal Delivery    Surgeon: Jere Schmid MD      Anesthesia: Epidural    EBL:  See QBL Quantitative Blood Loss (mL)         Findings:    Sex: female     Weight: 6#12oz  3060 g      Apgars: 8/9   \"Mana\"    Episiotomy: none  Laceration: 2nd perineal and bilateral vaginal    This patient is a 34 year old year old female  at 39w6d weeks of gestation who presented for normal labor.  Prenatal care was unremarkable.  Labor course was unremarkable.  The patient progressed to complete and began pushing.  After approximately 60 minutes, the patient had brought the vertex presentation down to the perineum.  The head was delivered over the perineum in vertex presentation [place].      There was no nuchal cord.  The anterior and then the posterior shoulder were  delivered without difficulty and the rest of the body followed easily.  The infant was placed on the mother's abdomen and after a period of observation with nursing assessment, the umbilical cord was doubly clamped and transected.      The placenta was then delivered spontaneously intact.  There was good hemostasis with IV Pitocin and uterine massage.      The patient had a 2nd degree perineal laceration which extended 2-3 cm up the vaginal canal on both sides at the 4 and 8 O-clock positions.  The repair was performed in the usual fashion in layers using 2.0 Vicryl Rapide with good re-approximation noted.  The infant and mother were stable for normal post partum recovery.  Sponge, instrument and needle counts were correct at the end of the procedure.    Jere Schmid MD  [facility]- Obstetrics & Gynecology

## 2024-01-07 NOTE — PROGRESS NOTES
Telephone call through answering service..  Patient 39 weeks pregnant with pink discharge.  No significant bleeding.  No regular contractions.  Good fetal movement.  Patient was reassured regarding small amount of bloody show.  Signs and symptoms of labor reviewed.  Call if decreased fetal movement, active bleeding, leaking fluid, regular contractions

## 2024-01-07 NOTE — PROGRESS NOTES
Pt is a 34 year old female admitted to TRG2/TRG2-A.     Chief Complaint   Patient presents with    R/o Labor      Pt is  39w6d intra-uterine pregnancy.  History obtained, consents signed. Oriented to room, staff, and plan of care. Pt c/o ctx since 2100 that are now 5 minutes apart. Also reports spotting, denies LOF.     EFM tested and applied, FHTs tracing and audible in 130s. Abdomen soft and non-tender.

## 2024-01-07 NOTE — PLAN OF CARE
Problem: POSTPARTUM  Goal: Optimize infant feeding at the breast  Description: INTERVENTIONS:  - Initiate breast feeding within first hour after birth.   - Monitor effectiveness of current breast feeding efforts.  - Assess support systems available to mother/family.  - Identify cultural beliefs/practices regarding lactation, letdown techniques, maternal food preferences.  - Assess mother's knowledge and previous experience with breast feeding.  - Provide information as needed about early infant feeding cues (e.g., rooting, lip smacking, sucking fingers/hand) versus late cue of crying.  - Discuss/demonstrate breast feeding aids (e.g., infant sling, nursing footstool/pillows, and breast pumps).  - Encourage mother/other family members to express feelings/concerns, and actively listen.  - Educate father/SO about benefits of breast feeding and how to manage common lactation challenges.  - Recommend avoidance of specific medications or substances incompatible with breast feeding.  - Assess and monitor for signs of nipple pain/trauma.  - Instruct and provide assistance with proper latch.  - Review techniques for milk expression (breast pumping) and storage of breast milk. Provide pumping equipment/supplies, instructions and assistance, as needed.  - Encourage rooming-in and breast feeding on demand.  - Encourage skin-to-skin contact.  - Provide LC support as needed.  - Assess for and manage engorgement.  - Provide breast feeding education handouts and information on community breast feeding support.   Outcome: Progressing  Goal: Establishment of adequate milk supply with medication/procedure interruptions  Description: INTERVENTIONS:  - Review techniques for milk expression (breast pumping).   - Provide pumping equipment/supplies, instructions, and assistance until it is safe to breastfeed infant.  Outcome: Progressing  Goal: Experiences normal breast weaning course  Description: INTERVENTIONS:  - Assess for and manage  engorgement.  - Instruct on breast care.  - Provide comfort measures.  Outcome: Progressing  Goal: Appropriate maternal -  bonding  Description: INTERVENTIONS:  - Assess caregiver- interactions.  - Assess caregiver's emotional status and coping mechanisms.  - Encourage caregiver to participate in  daily care.  - Assess support systems available to mother/family.  - Provide /case management support as needed.  Outcome: Progressing

## 2024-01-07 NOTE — PROGRESS NOTES
LABOR PROGRESS NOTE    Subjective:   Patient without complaints.  Tolerating contractions well   Objective:     Vitals:    24 0756   BP: 105/72   Pulse: 80   Resp: 14   Temp: 98.4 °F (36.9 °C)     Temp (24hrs), Av.3 °F (36.8 °C), Min:98.1 °F (36.7 °C), Max:98.4 °F (36.9 °C)    FHT: Baseline 130, moderate variability, no decelerations, category 1  [place]: Contractions every 3 minutes    SVE: 2/90/-2  AROM clear    Assessment/Plan:   39w6d  Early labor.  Pitocin augmentation started.  GBS positive on Abx  FWB reassuring  Continue routine intrapartum care  May have epidural per request    Jere Schmid MD  [facility]- Obstetrics & Gynecology

## 2024-01-07 NOTE — PLAN OF CARE
Problem: BIRTH - VAGINAL/ SECTION  Goal: Fetal and maternal status remain reassuring during the birth process  Description: INTERVENTIONS:  - Monitor vital signs  - Monitor fetal heart rate  - Monitor uterine activity  - Monitor labor progression (vaginal delivery)  - DVT prophylaxis (C/S delivery)  - Surgical antibiotic prophylaxis (C/S delivery)  Outcome: Progressing     Problem: PAIN - ADULT  Goal: Verbalizes/displays adequate comfort level or patient's stated pain goal  Description: INTERVENTIONS:  - Encourage pt to monitor pain and request assistance  - Assess pain using appropriate pain scale  - Administer analgesics based on type and severity of pain and evaluate response  - Implement non-pharmacological measures as appropriate and evaluate response  - Consider cultural and social influences on pain and pain management  - Manage/alleviate anxiety  - Utilize distraction and/or relaxation techniques  - Monitor for opioid side effects  - Notify MD/LIP if interventions unsuccessful or patient reports new pain  - Anticipate increased pain with activity and pre-medicate as appropriate  Outcome: Progressing     Problem: ANXIETY  Goal: Will report anxiety at manageable levels  Description: INTERVENTIONS:  - Administer medication as ordered  - Teach and rehearse alternative coping skills  - Provide emotional support with 1:1 interaction with staff  Outcome: Progressing     Problem: Patient/Family Goals  Goal: Patient/Family Long Term Goal  Description: Patient's Long Term Goal: Safe, vaginal delivery     Interventions:  -   - See additional Care Plan goals for specific interventions  Outcome: Progressing  Goal: Patient/Family Short Term Goal  Description: Patient's Short Term Goal: Effective pain management     Interventions:   - IV meds, epidural   - See additional Care Plan goals for specific interventions  Outcome: Progressing

## 2024-01-07 NOTE — DISCHARGE INSTRUCTIONS
[facility] DISCHARGE INSTRUCTIONS     CONGRATULATIONS on the birth of your baby!!  We are happy that we have been able to be of service to you during your pregnancy.  We trust that your experience in the hospital and with the birth of your child has been a pleasant one.  These instructions are for your reference concerning your care at home between now and your six-week check-up.  Please call the office within the next few days to schedule your appointment.    REST  Since fatigue will probably be your biggest problem, you should try to rest in the morning and in the afternoon for at least 1½-2 hours if possible.  Getting up in the middle of the night to feed your baby interrupts your sleep cycle; two 4 hour periods of sleep do not equal one 8 hour period.  During the day while your baby is sleeping, do whatever you can to isolate yourself from the rest of the world so you can rest (i.e. turn off the phone ringer, put a sign on the front doorbell).    POSTPARTUM BLUES/DEPRESSION  Mood swings, crying spells, feeling overwhelmed and irritability are very common after childbirth.  Most women (50-80%) will experience some of these symptoms, which can last from a few days to 2 weeks.  If your symptoms last more than 2 weeks and/or include any of the following, you may have postpartum depression:  -feelings of sadness      -inability to care for yourself or your baby  -loss of interest in usual activities     -recurring thoughts of death/suicide  -difficulty sleeping or increased need for sleep   -feelings of worthlessness/hopelessness    Postpartum depression occurs in 8-12% of new mothers and is more common in mothers with a past history of depression.  If you have any of these symptoms and/or they persist for more than 2 weeks, please call our office/answering service or [facility] at [phone].    BREAST FEEDING  It is important to be in a relaxed atmosphere when you  are nursing.  You will find that your breasts will engorge and become tender within the next few days.  Even though the suction your baby creates may not meet your expectations, remember that he/she is just learning.  If your breasts remain hard after nursing, you may use a breast pump to release the remaining milk.  This will stimulate your breasts to produce more milk when the need arises.  If your nipples become sore, you should use Lanisol (lanolin) cream and allow your nipples to air-dry after nursing.  It will take approximately 10-12 days for milk supply and demand to balance, so please be patient.  Breastfeeding requires ample rest, fluids (8-10 glasses of water/day) and adequate calories (approximately 2200cal/day).  Please continue your prenatal vitamins the entire time you are breastfeeding.  The lactation consultants at [place] are available at [phone] to answer any questions or help with any problems.    BREAST CARE  We advise non-nursing mothers to continuously wear a tight-fitting bra and avoid any nipple/breast stimulation.  You should keep your bra as tight as you can tolerate, as this will help dry up your milk.  If your breasts are painful and feel engorged, you may use ice bags and Motrin for relief.  The engorgement and pain/discomfort will usually resolve within 1 week.  You may have some milky discharge from your breasts for several weeks.           EPISIOTOMY CARE  If you have an episiotomy/tear, the stitches used to close the incision will dissolve by themselves.  This process will take at least six weeks during which you should be careful with the area.  If peripads tend to chafe and irritate your skin, we recommend that you place a Tucks, a gauze filled with soothing medication, between your stitches and the peripad.  Tucks can by purchased at your local pharmacy.  Sitz baths may also aid in pain relief and healing; soak your bottom in a tub filled with room temperature water for 20  minutes once or twice per day.  We prefer that you take showers rather than baths, and avoid swimming, for 4-6 weeks after delivery.    SEXUAL INTERCOURSE  Please avoid tampons, douching and intercourse for at least 4-6 weeks, or until you have stopped bleeding, whichever is longer.  When you do resume intercourse, realize that you can start ovulating/become pregnant as early as 2-3 weeks after delivery, even if you are breastfeeding.  Please ask one of your doctors or call the office if you have any questions or desire contraception for use before your six-week check-up.  You may notice more vaginal dryness than usual, especially if you are breastfeeding.  An over-the-counter lubricant such as Replens or Astroglide may provide some relief.  Pain/discomfort at the episiotomy site is not unusual and may last anywhere from 2 weeks to several months.  Massaging the area may help to soften the scar tissue and hasten the healing process.    MENSTRUATION  You may have a vaginal discharge/light bleeding for anywhere from 2-6 weeks after delivery.  The flow may taper off and then suddenly become heavier a few weeks later.  Your first real period may come any time from 4-12 weeks after delivery, but may not come at all if you are nursing.    EXERCISE  We recommend that you avoid strenuous exercise or housework for at least 3-6 weeks after delivery. Realize that your exercise tolerance will be reduced, and therefore you should start slowly and increase gradually.  Walking is acceptable, unless it causes too much discomfort or fatigue.  You should climb stairs the least amount possible for the first week or two after delivery.  When you do climb them, take them slowly.  Avoid making numerous trips when you can organize and make just one.  You should also avoid driving a car, if possible, for the first 1-2 weeks.  Kegels exercises are important to maintain good pelvic muscle tone and help eliminate any urine leakage.  The  exercises involve tightening the muscles around the vagina; try stopping urination in midstream or squeezing around your fingers to learn the correct muscle groups.  Do these exercises several times a day in repetitions of 10; try doing them every time you get to a stoplight or a commercial comes on the TV.    VITAMINS  We strongly encourage you to continue taking your prenatal vitamin until your six-week check-up or until you stop nursing, whichever is longer.  The iron in the vitamin will help to resolve any anemia from the blood loss of the delivery.  Adequate calcium intake is important for all women, but especially while nursing.  Your total calcium intake should be 1200mg/day.  Since the average women gets approximately 500mg in her diet, most women will require 500-700mg of supplemental calcium/day.  You can purchase a supplement such as Tums Ex, Citracal, Oscal or Viactin at your local pharmacy.      Post Vaginal Delivery Home Care Instructions       We hope you were pleased with your care at [facility].  We wish you the best outcome and overall experience with the delivery of your baby.  These instructions will help to minimize pain, limit the risk for an infection, and improve the likelihood of a successful recovery.    What to Expect:  Abdominal cramping after delivery especially if you are breastfeeding.   Vaginal bleeding for about 4-6 weeks that may be followed by a yellow or white discharge for a few more weeks.  Your period will resume in approximately 6-8 weeks, unless you are breastfeeding.    If you are bottle feeding, you may notice breast engorgement in about 3 days.  Your breast may be sore and hard. Please wear a tight fitted bra or sports bra for 24-36 hours to help prevent your breast from producing milk, and use ice packs to relive any discomfort.  If you are breastfeeding, nipple dryness is very common the first few days.    Constipation is common after having a baby.  Please increase  fluid and fiber in your diet.      Over-The-Counter Medication  Non-prescription anti-inflammatory medications can also help to ease the pain.  You may take Aleve, Tylenol or Ibuprofen   Colace or Metamucil for Constipation  Lanolin for dry nipples  Tucks, Witch Hazel and Epifoam for Episiotomy discomfort.   Drink a full glass of water with oral medication and take as directed.    Wound Care  The following instructions will promote proper healing and help to prevent infection  Episiotomy Care: Sitz Bath for 15mins, 2-3 times a day,    Bathing/Showers  You may resume showers  No baths, swimming, hot tubs until your post-partum visit    Home Medication  Resume your home medications as instructed    Diet  Resume your normal diet    Activity  Refrain from vaginal intercourse, vaginal suppositories, tampon use or douches until after your post-partum visit.  No exercising for 4 weeks  You may climb stairs minimally for the 1st week.    Do not do heavy housework for at least 2-3 weeks    Return to Work or School  You may return to work in approximately 6 weeks  Contact your obstetrician’s office, if you need a medical release.     Driving  Avoid driving if you are taking narcotics for pain relief.    Follow-up Appointment with Your Obstetrician  Call your obstetrician’s office today for an appointment in six weeks.    Verify your appointment date, day, time, and location.  At your 1st post-partum office visit:  Your progress will be evaluated, findings reviewed, and any additional concerns and instructions will be discussed.    Questions or Concerns  Call your obstetrician’s office if you experience the following:  Severe pain not controlled by pain medication  Too much pain when touched; yellowish, greenish, or bloody discharge, foul smelling vaginal discharge, cut site opens up  Heavy bleeding,problems with pain that does not go away or gets worse  Shortness of breath or sudden onset of chest pain  Fever of 100.4 F (38  C) or higher, chills, warmth around wound that will not heal  Redness, increased swelling or drainage from your incision  Crying and periods of sadness that prevents you from caring for yourself and your baby or you feel like harming yourself or baby.  Burning sensation during urination or inability to urinate or have bowel movements  Swelling, redness or abnormal warmth to your leg/calf  Swollen, hard, or painful breasts  You are not feeling better in 2 to 3 days, or are feeling increasingly worse  If your call is made after office hours, a physician will be available to help you.  There is always a provider covering our patients.                MEDICATIONS offered/used during your stay:    @ MOTRIN (Ibuprofin 600mg)   1 tab every 6 hours as needed for pain   Last taken at:   --> Next dose due at:       @ TYLENOL (Acetaminophen 500)   1-2 tabs, every 6 hours, as needed for increased pain.  Last taken at:   --> Next dose due at:      @ COLACE (Docusate Sodium 100mg)  1 tab two times per day as needed for stool softening   (once in am/once in pm)  Last taken at:   --> Next dose due at:      @ SIMETHICONE (Mylicon 80mg) Chewable Tab   1 tab daily as needed for gas.  Last taken at:    @ DERMOPLAST (Pain Relieving Spray)   Use as needed for perineal discomfort    @ TUCKS (Witch-Chanelle Glycerin pads)   Use as needed for hemorrhoids and/or perineal discomfort    Please see your Parent-Infant instruction pamphlet in your white [facility] [place] folder for further reference/review/instructions.  Thank you for coming to [facility].  We hope you've enjoyed your experience here.      WE WISH YOU MUCH DIOGO WITH YOUR NEW BABY AND MAY YOUR NIGHTS BE RESTFUL!!

## 2024-01-07 NOTE — H&P
[facility]  Obstetrics and Gynecology  Labor History & Physical    Dasia Mar Patient Status:  Inpatient    1990 MRN [id number]   Location [facility] LABOR & DELIVERY Attending Jere Schmid MD   Hospital Day 0 PCP Liliane Rahman MD     Subjective:   34 year old year old female  at 39w6d Estimated Date of Delivery: 24 presents due to regular uterine contractions since 1 am.  No leaking.  Some bloody show.       Complications: AMA, GBS positive    OB Ultrasounds:   OB Ultrasound on 11/15/23, EFW 34 %    OB History    Para Term  AB Living   3       2     SAB IAB Ectopic Multiple Live Births   2              # Outcome Date GA Lbr Isak/2nd Weight Sex Delivery Anes PTL Lv   3 Current            2 SAB 2023 5w0d    SAB      1 SAB 22 6w0d              History reviewed. No pertinent past medical history.  History reviewed. No pertinent surgical history.    Medications:  Medications Prior to Admission   Medication Sig Dispense Refill Last Dose    prenatal vitamin with DHA 27-0.8-228 MG Oral Cap Take 1 capsule by mouth daily.   2024 at 0800       Allergies:  Allergies   Allergen Reactions    Penicillin G RASH    Penicillins RASH        Social History:  Social History     Tobacco Use    Smoking status: Former     Packs/day: 0.00     Years: 0.00     Additional pack years: 0.00     Total pack years: 0.00     Types: Cigarettes    Smokeless tobacco: Never   Substance Use Topics    Alcohol use: Not Currently        Objective:     Vitals:    24 0458   BP: (!) 128/102   Pulse: 66   Resp:    Temp:        General: Alert and oriented, no apparent distress  Abdomen: Gravid, soft, non-tender  Extremities: Non-tender, negative Rosana's sign  SVE: Vertex, 1-2/80/-2 per nursing    Leopolds: EFW 7 lbs 0 oz    FETAL NONSTRESS TEST:  Baseline FHR: 130 per minute  Fetal heart variability: moderate  Fetal Heart Rate accelerations: 15x15   Fetal Heart Rate  decelerations: vasovagal reaction immediately after dilaudid injection.  Tracing category 1 currently  Uterine contractions: every 3 minutes  Fetal Non-stress Test Interpretation: reactive    GBS positive    Assessment/Plan:   34 year old  39w6d  Early labor  Epidural in active labor  GBBS protocol  FWB reassuring    Patient Active Problem List   Diagnosis    Advanced maternal age, primigravida, antepartum    Hyperemesis gravidarum    ASCUS with positive high risk HPV cervical    Supervision of normal first pregnancy, antepartum    Tetanus, diphtheria, and acellular pertussis (Tdap) vaccination declined    Syncopal episodes    Vulvar skin tag    Group B streptococcal infection in pregnancy    Condyloma acuminata    Pregnancy     Risks, benefits, alternatives and possible complications have been discussed in detail with the patient.  Pre-admission, admission, and post admission procedures and expectations were discussed in detail.  All questions answered, all appropriate consents to be signed at the hospital.    [facility]

## 2024-01-07 NOTE — PROGRESS NOTES
Pt ambulatory and transferred to room 114 accompanied by RN and significant other. Report given to LIDIA Denney RN.

## 2024-01-07 NOTE — PLAN OF CARE
Problem: BIRTH - VAGINAL/ SECTION  Goal: Fetal and maternal status remain reassuring during the birth process  Description: INTERVENTIONS:  - Monitor vital signs  - Monitor fetal heart rate  - Monitor uterine activity  - Monitor labor progression (vaginal delivery)  - DVT prophylaxis (C/S delivery)  - Surgical antibiotic prophylaxis (C/S delivery)  2024 by Yesenia Chaparro RN  Outcome: Completed  2024 by Yesenia Chaparro RN  Outcome: Progressing     Problem: PAIN - ADULT  Goal: Verbalizes/displays adequate comfort level or patient's stated pain goal  Description: INTERVENTIONS:  - Encourage pt to monitor pain and request assistance  - Assess pain using appropriate pain scale  - Administer analgesics based on type and severity of pain and evaluate response  - Implement non-pharmacological measures as appropriate and evaluate response  - Consider cultural and social influences on pain and pain management  - Manage/alleviate anxiety  - Utilize distraction and/or relaxation techniques  - Monitor for opioid side effects  - Notify MD/LIP if interventions unsuccessful or patient reports new pain  - Anticipate increased pain with activity and pre-medicate as appropriate  2024 by Yesenia Chaparro RN  Outcome: Completed  2024 by Yesenia Chaparro RN  Outcome: Progressing     Problem: ANXIETY  Goal: Will report anxiety at manageable levels  Description: INTERVENTIONS:  - Administer medication as ordered  - Teach and rehearse alternative coping skills  - Provide emotional support with 1:1 interaction with staff  2024 by Yesenia Chaparro RN  Outcome: Completed  2024 by Yesenia Chaparro RN  Outcome: Progressing     Problem: Patient/Family Goals  Goal: Patient/Family Long Term Goal  Description: Patient's Long Term Goal: Safe, vaginal delivery     Interventions:  -   - See additional Care Plan goals for specific interventions  2024 by Yesenia Chaparro  RN  Outcome: Completed  1/7/2024 0712 by Yesenia Chaparro RN  Outcome: Progressing  Goal: Patient/Family Short Term Goal  Description: Patient's Short Term Goal: Effective pain management     Interventions:   - IV meds, epidural   - See additional Care Plan goals for specific interventions  1/7/2024 1403 by Yesenia Chaparro RN  Outcome: Completed  1/7/2024 0712 by Yesenia Chaparro RN  Outcome: Progressing

## 2024-01-07 NOTE — ANESTHESIA PROCEDURE NOTES
Labor Analgesia    Date/Time: 1/7/2024 8:51 AM    Performed by: Zach Saucedo MD  Authorized by: Zach Saucedo MD      General Information and Staff    Start Time:  1/7/2024 8:51 AM  End Time:  1/7/2024 9:01 AM  Anesthesiologist:  Zach Saucedo MD  Performed by:  Anesthesiologist  Patient Location:  OB  Site Identification: surface landmarks    Reason for Block: labor epidural    Preanesthetic Checklist: patient identified, IV checked, risks and benefits discussed, monitors and equipment checked, pre-op evaluation, timeout performed, IV bolus, anesthesia consent and sterile technique used      Procedure Details    Patient Position:  Sitting  Prep: ChloraPrep    Monitoring:  Heart rate and continuous pulse ox  Approach:  Midline    Epidural Needle    Injection Technique:  VIDHYA air  Needle Type:  Tuohy  Needle Gauge:  17 G  Needle Length:  3.375 in  Needle Insertion Depth:  6  Location:  L4-5    Spinal Needle      Catheter    Catheter Type:  End hole  Catheter Size:  19 G  Catheter at Skin Depth:  12  Test Dose:  Negative    Assessment      Additional Comments     Test Dose Given at 0901 am  Fentanyl 2 mc/ml + Ropivicaine 0.15% epidural infusion 12cc/hr  Fentanyl 50 mcg/mL 100 mcg

## 2024-01-07 NOTE — ANESTHESIA PREPROCEDURE EVALUATION
PRE-OP EVALUATION    Patient Name: Dasia Mar    Admit Diagnosis: Pregnancy [Z34.90]    Pre-op Diagnosis: IUP @ 39.6 weeks, IOL, labor pains    Anesthesia Procedure: LABOR ANALGESIA    * No surgeons found in log *    Pre-op vitals reviewed.  Temp: 98.4 °F (36.9 °C)  Pulse: 80  Resp: 14  BP: 120/79     Body mass index is 28.32 kg/m².    Current medications reviewed.  Hospital Medications:   lactated ringers infusion   Intravenous Continuous    dextrose in lactated ringers 5% infusion   Intravenous PRN    lactated ringers IV bolus 500 mL  500 mL Intravenous PRN    acetaminophen (Tylenol Extra Strength) tab 500 mg  500 mg Oral Q6H PRN    acetaminophen (Tylenol Extra Strength) tab 1,000 mg  1,000 mg Oral Q6H PRN    ibuprofen (Motrin) tab 600 mg  600 mg Oral Once PRN    ondansetron (Zofran) 4 MG/2ML injection 4 mg  4 mg Intravenous Q6H PRN    oxyTOCIN in sodium chloride 0.9% (Pitocin) 30 Units/500mL infusion premix  62.5-900 tricia-units/min Intravenous Continuous    terbutaline (Brethine) 1 MG/ML injection 0.25 mg  0.25 mg Subcutaneous PRN    sodium citrate-citric acid (Bicitra) 500-334 MG/5ML oral solution 30 mL  30 mL Oral PRN    [COMPLETED] ceFAZolin (Ancef) 2 g in 20mL IV syringe premix  2 g Intravenous Once    ceFAZolin (Ancef) 1 g in dextrose 5% 100mL IVPB-ADD  1 g Intravenous Q8H    HYDROmorphone (Dilaudid) 1 MG/ML injection 1 mg  1 mg Intravenous Q2H PRN    [COMPLETED] lactated ringers IV bolus 1,000 mL  1,000 mL Intravenous Once    fentaNYL-bupivacaine 2 mcg/mL-0.125% in sodium chloride 0.9% 100 mL EPIDURAL infusion premix  12 mL/hr Epidural Continuous    [COMPLETED] fentaNYL (Sublimaze) 50 mcg/mL injection 100 mcg  100 mcg Epidural Once    EPHEDrine (PF) 25 MG/5 ML injection 5 mg  5 mg Intravenous PRN    nalbuphine (Nubain) 10 mg/mL injection 2.5 mg  2.5 mg Intravenous Q15 Min PRN    oxyTOCIN in sodium chloride 0.9% (Pitocin) 30 Units/500mL infusion premix  0.5-20 tricia-units/min Intravenous Continuous     lidocaine 1.5%-EPINEPHrine 1:200,000 (Xylocaine-Epinephrine) injection   Epidural PRN    lidocaine PF (Xylocaine-MPF) 1% injection   Infiltration PRN       Outpatient Medications:     Medications Prior to Admission   Medication Sig Dispense Refill Last Dose    prenatal vitamin with DHA 27-0.8-228 MG Oral Cap Take 1 capsule by mouth daily.   1/6/2024 at 0800       Allergies: Penicillin g and Penicillins      Anesthesia Evaluation    Patient summary reviewed.    Anesthetic Complications  (-) history of anesthetic complications         GI/Hepatic/Renal                                 Cardiovascular                                         (-) angina              Endo/Other                                  Pulmonary                           Neuro/Psych                                      History reviewed. No pertinent surgical history.  Social History     Socioeconomic History    Marital status:    Tobacco Use    Smoking status: Former     Packs/day: 0.00     Years: 0.00     Additional pack years: 0.00     Total pack years: 0.00     Types: Cigarettes    Smokeless tobacco: Never   Vaping Use    Vaping Use: Never used   Substance and Sexual Activity    Alcohol use: Not Currently    Drug use: Never    Sexual activity: Yes     Partners: Male   Other Topics Concern    Caffeine Concern Yes     Comment: 2x cup daily    Exercise Yes     Comment: 5-6x week    Seat Belt Yes     History   Drug Use Unknown     Available pre-op labs reviewed.  Lab Results   Component Value Date    WBC 8.9 01/07/2024    WBC 7.7 11/24/2023    RBC 4.69 01/07/2024    RBC 4.31 11/24/2023    HGB 12.9 01/07/2024    HGB 12.0 11/24/2023    HCT 38.3 01/07/2024    HCT 36.6 11/24/2023    MCV 81.7 01/07/2024    MCV 84.9 11/24/2023    MCH 27.5 01/07/2024    MCH 27.8 11/24/2023    MCHC 33.7 01/07/2024    MCHC 32.8 11/24/2023    RDW 13.6 01/07/2024    RDW 13.4 11/24/2023    .0 01/07/2024     11/24/2023               Airway      Mallampati:  II  Mouth opening: 3 FB  TM distance: 4 - 6 cm  Neck ROM: full Cardiovascular      Rhythm: regular  Rate: normal     Dental             Pulmonary      Breath sounds clear to auscultation bilaterally.               Other findings              ASA: 2   Plan: epidural       Post-procedure pain management plan discussed with surgeon and patient.    Comment: Benefits of epidural analgesia for labor pain were discussed with Dasia Mar, including significant pain relief during labor, and gateway for quick neuraxial anesthesia in case of urgent . Realistic expectations were also discussed including necessity for additional dosing, or replacement of catheter if coverage is inadequate; nausea/vomiting, pruritus, PPDH, as well as other serious but rare complications including infection, epidural hematoma, nerve/cord injury. Patient verbalizes understanding of expectations and risk. All questions were answered and she wished to proceed. Adequate platelet level.        Plan/risks discussed with: patient and spouse                Present on Admission:  **None**

## 2024-01-07 NOTE — PROGRESS NOTES
NURSING ADMISSION NOTE    Patient admitted via Wheelchair  Oriented to room.  Safety precautions initiated.  Bed in low position.  Call light in reach.  Both mom/ infant ID bands verified. Hugs and kisses on.

## 2024-01-08 LAB
HCT VFR BLD AUTO: 29.9 %
HGB BLD-MCNC: 10 G/DL

## 2024-01-08 RX ORDER — PSEUDOEPHEDRINE HCL 30 MG
100 TABLET ORAL 2 TIMES DAILY PRN
Qty: 30 CAPSULE | Refills: 0 | Status: SHIPPED
Start: 2024-01-08

## 2024-01-08 RX ORDER — IBUPROFEN 600 MG/1
600 TABLET ORAL EVERY 6 HOURS PRN
Qty: 30 TABLET | Refills: 0 | Status: SHIPPED
Start: 2024-01-08

## 2024-01-08 NOTE — CM/SW NOTE
spoke to patient (Dasia) and spouse (Robe) to review insurance and PCP for infant. Robe confirmed that infant will be added to [facility] insurance plan that Dasia delivered under. PCP for infant will be Leo Plunkett. Dasia plans on breast feeding and did get her breast pump from insurance plan. No other needs at this time.

## 2024-01-08 NOTE — PROGRESS NOTES
[facility]  Obstetrics and Gynecology    OB/GYN: Postpartum Progress Note     SUBJECTIVE:  Patient is a 34 year old  female who is s/p . She is PPD# 1.   Doing well.  Denies fever, chills, N, V, chest pain and SOB. Bleeding has been stable. Voiding without difficulty.  Passing flatus.  denies Bm. Tolerating general diet. Ambulating without difficulty. .     OBJECTIVE:  Vitals:    24 1500 24 1520 24 1925 24 0800   BP: 113/67 109/58 103/61 107/66   Pulse:  69 71 69   Resp:  17 16 20   Temp:  98.2 °F (36.8 °C) 98 °F (36.7 °C) 98.1 °F (36.7 °C)   TempSrc:  Axillary Oral Oral   Weight:       Height:           Physical Exam:    General:    alert, appears stated age, cooperative, and no distress   Lochia:  appropriate   Uterine Fundus:   firm below umbilicus   Perineum:  healing well, no significant drainage, no dehiscence, no significant erythema    DVT Evaluation:  No evidence of DVT seen on physical exam.  Negative Rosana's sign.  No cords or calf tenderness.  No significant calf/ankle edema.     Urinary output is adequate.   I/O last 3 completed shifts:  In: 1645.6 [I.V.:645.6; IV PIGGYBACK:1000]  Out: 1076 [Urine:825; Blood:251]      Labs:  Recent Labs   Lab 24  0415 24  0809   RBC 4.69  --    HGB 12.9 10.0*   HCT 38.3 29.9*   MCV 81.7  --    MCH 27.5  --    MCHC 33.7  --    RDW 13.6  --    NEPRELIM 6.49  --    WBC 8.9  --    .0  --        ASSESSMENT/PLAN:  Patient is a 34 year old  female who is s/p  PPD# 1.     Doing well   Continue routine postpartum care  Vitals per routine   Encourage ambulation   Plan for discharge to home tomorrow in AM, d/w patient   Follow up in 6 weeks      Jane Prescott MD   [facility] - OBGYN      Note to patient and family   The 21st Century Cures Act makes medical notes available to patients in the interest of transparency.  However, please be advised that this is a medical document.  It is intended as jrkj-em-engy  communication.  It is written and medical language may contain abbreviations or verbiage that are technical and unfamiliar.  It may appear blunt or direct.  Medical documents are intended to carry relevant information, facts as evident, and the clinical opinion of the practitioner.

## 2024-01-08 NOTE — DISCHARGE SUMMARY
[facility]  Discharge Summary    Dasia Mar Patient Status:  inpatient    1990 MRN [id number]   Location -A Attending [facility]   Hosp Day # 1 PCP Liliane Rahman MD     Date of Admission: 2024    Date of Discharge: 24    Admitting Diagnosis: Pregnancy [Z34.90]    Discharge Diagnosis:  s/p     Hospital Course: The patient is a 34 year old female now  who was admitted on 24 at 39wk6d for labor. Pitocin was initiated. AROM performed. Epidural was placed for pain management per patient request. The patient progressed to complete. S/p  on 24. Please refer to delivery note for further details. PPD#1, the patient was doing well. She was ambulating and voiding without difficulty. She was tolerating a general diet. Pain was well controlled.  Plan for discharge PPD#2, as long as the patient continued to do well and was meeting post partum expectations. She was discharged to home and instructed to follow up in 6 weeks.     Consultations: none    Procedures:     Complications: none    Discharge Condition: Stable    Discharge Medications: Current Discharge Medication List       Medication List        START taking these medications      docusate sodium 100 MG Caps  Commonly known as: COLACE  Take 100 mg by mouth 2 (two) times daily as needed for constipation.     ibuprofen 600 MG Tabs  Commonly known as: Motrin  Take 1 tablet (600 mg total) by mouth every 6 (six) hours as needed for Pain.            CONTINUE taking these medications      prenatal vitamin with DHA 27-0.8-228 MG Caps               Where to Get Your Medications        You can get these medications from any pharmacy    Bring a paper prescription for each of these medications  docusate sodium 100 MG Caps  ibuprofen 600 MG Tabs           Follow up Visits: Follow-up with [facility] in 6 weeks      Jane Prescott MD   [facility] - OBGYN        Note to patient and family   The 21st Century Cures Act makes  medical notes available to patients in the interest of transparency.  However, please be advised that this is a medical document.  It is intended as [id number]-cvpq communication.  It is written and medical language may contain abbreviations or verbiage that are technical and unfamiliar.  It may appear blunt or direct.  Medical documents are intended to carry relevant information, facts as evident, and the clinical opinion of the practitioner.

## 2024-01-08 NOTE — PROGRESS NOTES
Labor Analgesia Follow Up Note    Patient underwent epidural anesthesia for labor analgesia,    Placenta Date/Time: 1/7/2024  1:15 PM     Delivery Date/Time:: 1/7/2024   1:12 PM     /61 (BP Location: Right arm)   Pulse 71   Temp 98 °F (36.7 °C) (Oral)   Resp 16   Ht 1.524 m (5')   Wt 65.8 kg (145 lb)   LMP 04/03/2023 (Exact Date)   Breastfeeding Yes   BMI 28.32 kg/m²     Assessment:  Patient seen and no apparent anesthesia related complications.    Thank you for asking us to participate in the care of your patient.

## 2024-01-08 NOTE — PLAN OF CARE
Problem: POSTPARTUM  Goal: Optimize infant feeding at the breast  Description: INTERVENTIONS:  - Initiate breast feeding within first hour after birth.   - Monitor effectiveness of current breast feeding efforts.  - Assess support systems available to mother/family.  - Identify cultural beliefs/practices regarding lactation, letdown techniques, maternal food preferences.  - Assess mother's knowledge and previous experience with breast feeding.  - Provide information as needed about early infant feeding cues (e.g., rooting, lip smacking, sucking fingers/hand) versus late cue of crying.  - Discuss/demonstrate breast feeding aids (e.g., infant sling, nursing footstool/pillows, and breast pumps).  - Encourage mother/other family members to express feelings/concerns, and actively listen.  - Educate father/SO about benefits of breast feeding and how to manage common lactation challenges.  - Recommend avoidance of specific medications or substances incompatible with breast feeding.  - Assess and monitor for signs of nipple pain/trauma.  - Instruct and provide assistance with proper latch.  - Review techniques for milk expression (breast pumping) and storage of breast milk. Provide pumping equipment/supplies, instructions and assistance, as needed.  - Encourage rooming-in and breast feeding on demand.  - Encourage skin-to-skin contact.  - Provide LC support as needed.  - Assess for and manage engorgement.  - Provide breast feeding education handouts and information on community breast feeding support.   Outcome: Progressing  Goal: Establishment of adequate milk supply with medication/procedure interruptions  Description: INTERVENTIONS:  - Review techniques for milk expression (breast pumping).   - Provide pumping equipment/supplies, instructions, and assistance until it is safe to breastfeed infant.  Outcome: Progressing  Goal: Appropriate maternal -  bonding  Description: INTERVENTIONS:  - Assess caregiver-  interactions.  - Assess caregiver's emotional status and coping mechanisms.  - Encourage caregiver to participate in  daily care.  - Assess support systems available to mother/family.  - Provide /case management support as needed.  Outcome: Progressing

## 2024-01-09 VITALS
TEMPERATURE: 99 F | HEART RATE: 73 BPM | DIASTOLIC BLOOD PRESSURE: 60 MMHG | WEIGHT: 145 LBS | HEIGHT: 60 IN | SYSTOLIC BLOOD PRESSURE: 117 MMHG | BODY MASS INDEX: 28.47 KG/M2 | RESPIRATION RATE: 18 BRPM

## 2024-01-09 NOTE — PLAN OF CARE
Problem: POSTPARTUM  Goal: Optimize infant feeding at the breast  Description: INTERVENTIONS:  - Initiate breast feeding within first hour after birth.   - Monitor effectiveness of current breast feeding efforts.  - Assess support systems available to mother/family.  - Identify cultural beliefs/practices regarding lactation, letdown techniques, maternal food preferences.  - Assess mother's knowledge and previous experience with breast feeding.  - Provide information as needed about early infant feeding cues (e.g., rooting, lip smacking, sucking fingers/hand) versus late cue of crying.  - Discuss/demonstrate breast feeding aids (e.g., infant sling, nursing footstool/pillows, and breast pumps).  - Encourage mother/other family members to express feelings/concerns, and actively listen.  - Educate father/SO about benefits of breast feeding and how to manage common lactation challenges.  - Recommend avoidance of specific medications or substances incompatible with breast feeding.  - Assess and monitor for signs of nipple pain/trauma.  - Instruct and provide assistance with proper latch.  - Review techniques for milk expression (breast pumping) and storage of breast milk. Provide pumping equipment/supplies, instructions and assistance, as needed.  - Encourage rooming-in and breast feeding on demand.  - Encourage skin-to-skin contact.  - Provide LC support as needed.  - Assess for and manage engorgement.  - Provide breast feeding education handouts and information on community breast feeding support.   Outcome: Completed  Goal: Establishment of adequate milk supply with medication/procedure interruptions  Description: INTERVENTIONS:  - Review techniques for milk expression (breast pumping).   - Provide pumping equipment/supplies, instructions, and assistance until it is safe to breastfeed infant.  Outcome: Completed  Goal: Appropriate maternal -  bonding  Description: INTERVENTIONS:  - Assess caregiver-  interactions.  - Assess caregiver's emotional status and coping mechanisms.  - Encourage caregiver to participate in  daily care.  - Assess support systems available to mother/family.  - Provide /case management support as needed.  Outcome: Completed

## 2024-01-09 NOTE — PAYOR COMM NOTE
--------------  ADMISSION REVIEW     Payor: [employer]  Subscriber #:  [id number]  Authorization Number: [id number]    Admit date: 24  Admit time:  4:23 AM       REVIEW DOCUMENTATION:       H&P - H&P Note        H&P signed by Jere Schmid MD at 2024  5:02 AM       Author: Jere Schmid MD Service: OB/Gyn Author Type: Physician    Filed: 2024  5:02 AM Date of Service: 2024  4:57 AM Status: Signed    : Jere Schmid MD (Physician)         [facility]  Obstetrics and Gynecology  Labor History & Physical    Dasia Mar Patient Status:  Inpatient    1990 MRN [id number]   Location [facility] LABOR & DELIVERY Attending Jere Schmid MD   Hospital Day 0 PCP Liliane Rahman MD     Subjective:   34 year old year old female  at 39w6d Estimated Date of Delivery: 24 presents due to regular uterine contractions since 1 am.  No leaking.  Some bloody show.       Complications: AMA, GBS positive    OB Ultrasounds:   OB Ultrasound on 11/15/23, EFW 34 %    OB History    Para Term  AB Living   3       2     SAB IAB Ectopic Multiple Live Births   2              # Outcome Date GA Lbr Isak/2nd Weight Sex Delivery Anes PTL Lv   3 Current            2 SAB 2023 5w0d    SAB      1 SAB 22 6w0d            Objective:     Vitals:    24 0458   BP: (!) 128/102   Pulse: 66   Resp:    Temp:        General: Alert and oriented, no apparent distress  Abdomen: Gravid, soft, non-tender  Extremities: Non-tender, negative Rosana's sign  SVE: Vertex, 1-2/80/-2 per nursing    Leop[place]: EFW 7 lbs 0 oz    FETAL NONSTRESS TEST:  Baseline FHR: 130 per minute  Fetal heart variability: moderate  Fetal Heart Rate accelerations: 15x15   Fetal Heart Rate decelerations: vasovagal reaction immediately after dilaudid injection.  Tracing category 1 currently  Uterine contractions: every 3 minutes  Fetal Non-stress Test Interpretation:  reactive    GBS positive    Assessment/Plan:   34 year old  39w6d  Early labor  Epidural in active labor  GBBS protocol  FWB reassuring    Patient Active Problem List   Diagnosis    Advanced maternal age, primigravida, antepartum    Hyperemesis gravidarum    ASCUS with positive high risk HPV cervical    Supervision of normal first pregnancy, antepartum    Tetanus, diphtheria, and acellular pertussis (Tdap) vaccination declined    Syncopal episodes    Vulvar skin tag    Group B streptococcal infection in pregnancy    Condyloma acuminata    Pregnancy     Risks, benefits, alternatives and possible complications have been discussed in detail with the patient.  Pre-admission, admission, and post admission procedures and expectations were discussed in detail.  All questions answered, all appropriate consents to be signed at the hospital.    Jere Schmid MD  [facility] Ob/Gyn      Electronically signed by Jere Schmid MD on 2024  5:02 AM           Delivery date/time:  24 13:12:25   Delivery type: Normal spontaneous vaginal delivery    Weight: 3060 g 6 lb 11.9 oz       Sex: female     MEDICATIONS ADMINISTERED IN LAST 1 DAY:  docusate sodium (Colace) cap 100 mg       Date Action Dose Route User    2024 0533 Given 100 mg Oral Tyra Reyes RN    2024 1751 Given 100 mg Oral Miriam Kwan RN          ibuprofen (Motrin) tab 600 mg       Date Action Dose Route User    2024 0533 Given 600 mg Oral Tyra Reyes RN    2024 2347 Given 600 mg Tyra Dyer RN    2024 1751 Given 600 mg Oral Miriam Kwan RN    2024 1206 Given 600 mg Oral Miriam Kwan RN          prenatal vitamin with DHA (PNV with DHA) cap 1 capsule       Date Action Dose Route User    2024 0945 Given 1 capsule Oral Miriam Kwan RN            Vitals (last day)       Date/Time Temp Pulse Resp BP SpO2 Weight O2 Device O2 Flow Rate (L/min) Who     01/09/24 0807 98.8 °F (37.1 °C) 73 18 117/60 -- -- None (Room air) -- TV    01/08/24 2030 -- -- -- -- -- -- None (Room air) -- KB    01/08/24 1925 98.8 °F (37.1 °C) 77 16 110/71 -- -- -- -- BA    01/08/24 0930 -- -- -- -- -- -- None (Room air) -- RB    01/08/24 0800 98.1 °F (36.7 °C) 69 20 107/66 -- -- -- -- JM

## 2024-01-09 NOTE — PROGRESS NOTES
DISCHARGE NOTE  Discharge and follow-up appointment information reviewed with parents, no questions following.  ID Bands checked and verified at bedside. HUGS and Kisses tags removed  Baby in: car seat  Parent ambulatory  Escorted off unit by: RN

## 2024-01-11 ENCOUNTER — TELEPHONE (OUTPATIENT)
Dept: OBGYN UNIT | Facility: HOSPITAL | Age: 34
End: 2024-01-11

## 2024-01-11 NOTE — PROGRESS NOTES
REV'D SELF AND INFANT CARE WITH MOM. VERBALIZES UNDERSTANDING OF INSTRUCTIONS REV'D. ENCOURAGED TO FOLLOW-UP WITH [facility] AS DIRECTED AND WITH QUESTIONS.

## 2024-02-21 ENCOUNTER — POSTPARTUM (OUTPATIENT)
Dept: OBGYN CLINIC | Facility: CLINIC | Age: 34
End: 2024-02-21
Payer: COMMERCIAL

## 2024-02-21 VITALS
BODY MASS INDEX: 24 KG/M2 | WEIGHT: 122.81 LBS | SYSTOLIC BLOOD PRESSURE: 118 MMHG | HEART RATE: 79 BPM | DIASTOLIC BLOOD PRESSURE: 70 MMHG

## 2024-02-21 DIAGNOSIS — N39.498 OTHER URINARY INCONTINENCE: Primary | ICD-10-CM

## 2024-02-21 PROBLEM — Z28.21 TETANUS, DIPHTHERIA, AND ACELLULAR PERTUSSIS (TDAP) VACCINATION DECLINED: Status: RESOLVED | Noted: 2023-10-30 | Resolved: 2024-02-21

## 2024-02-21 RX ORDER — POLYETHYLENE GLYCOL 3350 17 G/17G
17 POWDER, FOR SOLUTION ORAL DAILY
COMMUNITY

## 2024-02-21 NOTE — PATIENT INSTRUCTIONS
Pelvic floor PT     [facility] [place] [address] [address] [place] [address]                      [address]                                [place] [address]               [phone]

## 2024-02-21 NOTE — PROGRESS NOTES
[facility]  Obstetrics and Gynecology   Postpartum Progress Note    Subjective:     Dasia Mar is a 34 year old  female who is s/p  on 24. Her pregnancy was complicated by AMA, GBS positive. She reports doing well. Baby doing well and breast feeding. The patient reports vagina bleeding stopped. The patient denies emotional concerns.     Reports leakage of a small amount of urine when on the way to the bathroom. Voids appx every 2 hours. The volume of urine leakage has decreased over time. This is new post delivery.     Review of Systems:  General:  denies fevers, chills, fatigue and malaise.   Respiratory:  denies SOB, dyspnea, cough or wheezing  Cardiovascular:  denies chest pain, palpitations, exercise intolerance   GI: denies abdominal pain, diarrhea, constipation  :  denies dysuria, hematuria, increased urinary frequency.  denies abnormal uterine bleeding or vaginal discharge.       Objective:     Vitals:    24 1136   BP: 118/70   Pulse: 79   Weight: 122 lb 12.8 oz (55.7 kg)         Body mass index is 23.98 kg/m².    General: AAO.NAD.   CVS exam: normal peripheral perfusion  Chest: non-labored breathing, no tachypnea   Abdominal exam: soft, nontender, nondistended  Pelvic exam:   VULVA: normal appearing vulva with no masses, tenderness or lesions  PERINEUM: intact, well healed, no signs of infection, +tenderness.   VAGINA: normal appearing vagina with normal color and discharge, no lesions  CERVIX: normal appearing cervix without discharge or lesions  UTERUS: uterus is normal size, shape, consistency and nontender  ADNEXA: normal adnexa in size, nontender and no masses  Ext: non-tender, no edema    Labs:         Assessment:     Dasia Mar is a 34 year old  female who presents for postpartum visit   Patient Active Problem List   Diagnosis    Advanced maternal age, primigravida, antepartum (HCC)    Hyperemesis gravidarum (HCC)    ASCUS with positive high risk HPV cervical     Supervision of normal first pregnancy, antepartum (HCC)    Syncopal episodes    Vulvar skin tag    Group B streptococcal infection in pregnancy (HCC)    Condyloma acuminata    Pregnancy ([facility])         Plan:     Postpartum exam   - s/p    - doing well, urinary complaints-->service to pelvic floor PT  - no abnormal findings on physical exam   - may return to normal activity   Contraception counseling -->desires Mirena IUD  - discussion held with patient about family planning and contraception  -pap 2023 ASCUS HPV positive-->repeat 2024    All of the findings and plan were discussed with the patient.  She notes understanding and agrees with the plan of care.  All questions were answered to the best of my ability at this time.    RTC for IUD insertion and pap     Arthur Soliz MD        Note to patient and family   The 21st Century Cures Act makes medical notes available to patients in the interest of transparency.  However, please be advised that this is a medical document.  It is intended as zgsm-zf-oqnw communication.  It is written and medical language may contain abbreviations or verbiage that are technical and unfamiliar.  It may appear blunt or direct.  Medical documents are intended to carry relevant information, facts as evident, and the clinical opinion of the practitioner.

## 2024-03-19 ENCOUNTER — OFFICE VISIT (OUTPATIENT)
Dept: OBGYN CLINIC | Facility: CLINIC | Age: 34
End: 2024-03-19
Payer: COMMERCIAL

## 2024-03-19 VITALS
DIASTOLIC BLOOD PRESSURE: 72 MMHG | SYSTOLIC BLOOD PRESSURE: 116 MMHG | WEIGHT: 123 LBS | HEART RATE: 81 BPM | BODY MASS INDEX: 24 KG/M2

## 2024-03-19 DIAGNOSIS — Z30.430 ENCOUNTER FOR INSERTION OF INTRAUTERINE CONTRACEPTIVE DEVICE (IUD): ICD-10-CM

## 2024-03-19 DIAGNOSIS — Z01.812 PRE-PROCEDURAL LABORATORY EXAMINATION: ICD-10-CM

## 2024-03-19 DIAGNOSIS — Z97.5 IUD (INTRAUTERINE DEVICE) IN PLACE: Primary | ICD-10-CM

## 2024-03-19 PROBLEM — O09.519 ADVANCED MATERNAL AGE, PRIMIGRAVIDA, ANTEPARTUM (HCC): Status: RESOLVED | Noted: 2023-06-06 | Resolved: 2024-03-19

## 2024-03-19 PROBLEM — Z34.90 PREGNANCY (HCC): Status: RESOLVED | Noted: 2024-01-07 | Resolved: 2024-03-19

## 2024-03-19 PROBLEM — O21.0 HYPEREMESIS GRAVIDARUM (HCC): Status: RESOLVED | Noted: 2023-06-07 | Resolved: 2024-03-19

## 2024-03-19 PROBLEM — O98.819 GROUP B STREPTOCOCCAL INFECTION IN PREGNANCY (HCC): Status: RESOLVED | Noted: 2023-12-16 | Resolved: 2024-03-19

## 2024-03-19 PROBLEM — B95.1 GROUP B STREPTOCOCCAL INFECTION IN PREGNANCY (HCC): Status: RESOLVED | Noted: 2023-12-16 | Resolved: 2024-03-19

## 2024-03-19 PROBLEM — Z34.00 SUPERVISION OF NORMAL FIRST PREGNANCY, ANTEPARTUM (HCC): Status: RESOLVED | Noted: 2023-07-19 | Resolved: 2024-03-19

## 2024-03-19 LAB
CONTROL LINE PRESENT WITH A CLEAR BACKGROUND (YES/NO): YES YES/NO
KIT LOT #: NORMAL NUMERIC
PREGNANCY TEST, URINE: NEGATIVE

## 2024-03-19 PROCEDURE — 81025 URINE PREGNANCY TEST: CPT | Performed by: OBSTETRICS & GYNECOLOGY

## 2024-03-19 PROCEDURE — 58300 INSERT INTRAUTERINE DEVICE: CPT | Performed by: OBSTETRICS & GYNECOLOGY

## 2024-03-19 NOTE — PATIENT INSTRUCTIONS
[facility] [place] Department of OB/GYN  After Care Instructions for IUD      Bleeding   You may experience irregular bleeding for the fist 3-6 months.  If your bleeding becomes heavier than a normal menstrual cycle, please contact our office.    Pain  You may experience mild menstrual cramping after the IUD insertion that will typically last 24-48hrs.  You may take Ibuprofen, Tylenol or Aleve to relieve the discomfort.  If you experience severe or persistent pain, please contact our office.       Restrictions    You should avoid sexual intercourse or tampon use for 1 day after insertion.  Please use a backup method of contraception for 4-7 days with the Mirena and [place].    When to contact our office  If you are experiencing discomfort described as worse than menstrual cramps that is not relieved by ibuprofen   Fever of 100.4 or greater  Vaginal bleeding that is saturating 1 pad per hour    Any discomfort or poking sensation during intercourse or other sexual activity      Follow up in 4-6 weeks for IUD check.   If you have additional questions or concerns, please call us at [phone].

## 2024-03-19 NOTE — PROCEDURES
Procedure: Intrauterine device insertion - Mirena    Date of Procedure: 24    Pre-procedure diagnosis:  Encounter for contraception     Post-procedure diagnosis:   Encounter for contraction     Indications:   34 year old female  who presents for Intrauterine device insertion with Mirena for contraception.     Procedure details:  The patient was counseled on various methods of contraception. The patient requested long acting reversible contraception with Mirena Intrauterine Device. The procedure, risks, benefits and alternatives were discussed with the patient. The patient was informed of risks including but not limited to the risk of bleeding, infection, injury, improper placement, pregnancy and irregular bleeding. All questions and concerns were addressed. The patient provided verbal and written consent.     The patient was placed in supine position with legs position in stirrups. A sterile speculum was placed in the vagina and the cervix was visualized. The cervix was cleaned and prepped with betadine. Lidocaine gel followed by Allis clamp was placed on the anterior lip of the cervix. The uterus was sounded to 6 cm. The Mirena Intrauterine Device was then prepped and loaded in sterile fashion. The Mirena Intrauterine Device was then inserted through the cervical canal into the uterine cavity, deployed and the remaining device removed. The strings were visualized at the external os and cut to 2 cm. The Allis was removed. Good hemostasis was then noted. The Mirena IUD strings were then tucked behind the cervix and the all instruments were removed from the vagina. The patient tolerated the procedure well. Brief BSUS performed but unable to visualize uterus adequately as bladder was not full. She was counseled on the recommendation for back up method of contraception for 7 days after placement. The patient verbalized understanding.     Disposition: Stable    Complications: None    Follow up: for Gyn US in  office to confirm placement     Jane Prescott MD   [facility] - OBGYN    Discussed with patient that there will not be further notification of normal or benign results other than receiving results on mychart. A mychart message or telephone call will be placed by the physician and/or office staff if results are abnormal.     Note to patient and family   The 21st Century Cures Act makes medical notes available to patients in the interest of transparency.  However, please be advised that this is a medical document.  It is intended as [id number] communication.  It is written and medical language may contain abbreviations or verbiage that are technical and unfamiliar.  It may appear blunt or direct.  Medical documents are intended to carry relevant information, facts as evident, and the clinical opinion of the practitioner.

## 2024-03-25 ENCOUNTER — TELEPHONE (OUTPATIENT)
Dept: OBGYN CLINIC | Facility: CLINIC | Age: 34
End: 2024-03-25

## 2024-03-25 NOTE — TELEPHONE ENCOUNTER
Pt states she feels though her IUD is coming out. She said she started feeling this way yesterday. Please advise, thank you

## 2024-03-28 ENCOUNTER — OFFICE VISIT (OUTPATIENT)
Dept: OBGYN CLINIC | Facility: CLINIC | Age: 34
End: 2024-03-28
Payer: COMMERCIAL

## 2024-03-28 VITALS
SYSTOLIC BLOOD PRESSURE: 114 MMHG | WEIGHT: 122.5 LBS | BODY MASS INDEX: 24 KG/M2 | DIASTOLIC BLOOD PRESSURE: 70 MMHG | HEART RATE: 70 BPM

## 2024-03-28 DIAGNOSIS — N84.2 VAGINAL POLYP: ICD-10-CM

## 2024-03-28 DIAGNOSIS — Z30.431 ENCOUNTER FOR ROUTINE CHECKING OF INTRAUTERINE CONTRACEPTIVE DEVICE (IUD): Primary | ICD-10-CM

## 2024-03-28 PROCEDURE — 88342 IMHCHEM/IMCYTCHM 1ST ANTB: CPT | Performed by: OBSTETRICS & GYNECOLOGY

## 2024-03-28 PROCEDURE — 88305 TISSUE EXAM BY PATHOLOGIST: CPT | Performed by: OBSTETRICS & GYNECOLOGY

## 2024-03-28 PROCEDURE — 99213 OFFICE O/P EST LOW 20 MIN: CPT | Performed by: OBSTETRICS & GYNECOLOGY

## 2024-03-28 PROCEDURE — 57135 EXCISION VAGINAL CYST/TUMOR: CPT | Performed by: OBSTETRICS & GYNECOLOGY

## 2024-03-28 NOTE — PROGRESS NOTES
[facility]  Obstetrics and Gynecology    Subjective:     Dasia Mar is a 34 year old  who presents for IUD follow up. Mirena IUD was placed on 3/19 for contraception. Pt states that there was concern at that time that the IUD was not placed in exactly correct position, so she has an ultrasound scheduled  for evaluation. On 3/24 she was using the restroom and noticed something abnormal coming from her vagina and she was worried it was the malpositioned IUD. Reports constant spotting since IUD was placed, but denies pain.      Objective:     Vitals:    24 1446   BP: 114/70   Pulse: 70   Weight: 122 lb 8 oz (55.6 kg)       Body mass index is 23.92 kg/m².    GEN: AAOx3, NAD, appears well, appears stated age  RESP: breathing comfortably  PELVIC:   Vulva: NEFG.   Vagina: Estrogenized, physiologic discharge. Small polypoid mass protruding near introitus, very close to her repaired 2nd degree obstetric laceration.    Cervix: No lesions or tenderness.  IUD strings 4cm from external os.  Chaperone offered and declined.       Polypectomy:   The polyp was grasped with a ringed forceps and removed with gentle rotational force. Silver nitrate was applied to the base with excellent hemostasis. Patient tolerated the procedure well. Specimen sent to pathology.  Consent was signed after reviewing risks/benefits of the procedure.       Assessment:     Dasia Mar is a 34 year old  with vaginal polyp s/p removal.      Plan:     -- follow up as previously scheduled for ultrasound to confirm IUD placement  -- recommend condom use if sexually active until then    Tyra Chanel MD  [facility]  3/28/2024 3:13 PM

## 2024-04-08 ENCOUNTER — ULTRASOUND ENCOUNTER (OUTPATIENT)
Dept: OBGYN CLINIC | Facility: CLINIC | Age: 34
End: 2024-04-08
Payer: COMMERCIAL

## 2024-05-29 ENCOUNTER — APPOINTMENT (OUTPATIENT)
Dept: PHYSICAL THERAPY | Facility: HOSPITAL | Age: 34
End: 2024-05-29
Attending: STUDENT IN AN ORGANIZED HEALTH CARE EDUCATION/TRAINING PROGRAM
Payer: COMMERCIAL

## 2024-06-03 ENCOUNTER — APPOINTMENT (OUTPATIENT)
Dept: PHYSICAL THERAPY | Facility: HOSPITAL | Age: 34
End: 2024-06-03
Attending: STUDENT IN AN ORGANIZED HEALTH CARE EDUCATION/TRAINING PROGRAM
Payer: COMMERCIAL

## 2024-06-10 ENCOUNTER — APPOINTMENT (OUTPATIENT)
Dept: PHYSICAL THERAPY | Facility: HOSPITAL | Age: 34
End: 2024-06-10
Attending: STUDENT IN AN ORGANIZED HEALTH CARE EDUCATION/TRAINING PROGRAM
Payer: COMMERCIAL

## 2024-06-17 ENCOUNTER — APPOINTMENT (OUTPATIENT)
Dept: PHYSICAL THERAPY | Facility: HOSPITAL | Age: 34
End: 2024-06-17
Attending: STUDENT IN AN ORGANIZED HEALTH CARE EDUCATION/TRAINING PROGRAM
Payer: COMMERCIAL

## 2024-06-24 ENCOUNTER — APPOINTMENT (OUTPATIENT)
Dept: PHYSICAL THERAPY | Facility: HOSPITAL | Age: 34
End: 2024-06-24
Attending: STUDENT IN AN ORGANIZED HEALTH CARE EDUCATION/TRAINING PROGRAM
Payer: COMMERCIAL

## 2024-06-26 ENCOUNTER — OFFICE VISIT (OUTPATIENT)
Dept: FAMILY MEDICINE CLINIC | Facility: CLINIC | Age: 34
End: 2024-06-26

## 2024-06-26 VITALS
RESPIRATION RATE: 16 BRPM | SYSTOLIC BLOOD PRESSURE: 102 MMHG | HEART RATE: 86 BPM | DIASTOLIC BLOOD PRESSURE: 60 MMHG | OXYGEN SATURATION: 97 % | WEIGHT: 124.63 LBS | BODY MASS INDEX: 24 KG/M2

## 2024-06-26 DIAGNOSIS — L03.316 CELLULITIS OF UMBILICUS: Primary | ICD-10-CM

## 2024-06-26 PROCEDURE — 99213 OFFICE O/P EST LOW 20 MIN: CPT | Performed by: FAMILY MEDICINE

## 2024-06-28 NOTE — PROGRESS NOTES
CHIEF COMPLAINT:   Chief Complaint   Patient presents with    Other     Burning and redness sensation on bellybutton x5 days          HPI:     Dasia Mar is a 34 year old female presents for belly button redness.    Belly button redness: on Saturday she noticed she had redness and swelling in her belly button.  By Monday it was almost resolved.  She had been applying antiseptic and Bacitracin.  She has no F/C.  She reports she has been tanning more lately. She also has had some constipation.               HISTORY:  No past medical history on file.   No past surgical history on file.   Family History   Problem Relation Age of Onset    Accidental death Father     No Known Problems Mother     Cancer Paternal Grandfather     No Known Problems Brother       Social History:   Social History     Socioeconomic History    Marital status:    Tobacco Use    Smoking status: Former     Current packs/day: 0.00     Types: Cigarettes    Smokeless tobacco: Never   Vaping Use    Vaping status: Never Used   Substance and Sexual Activity    Alcohol use: Not Currently    Drug use: Never    Sexual activity: Yes     Partners: Male   Other Topics Concern    Caffeine Concern Yes     Comment: 2x cup daily    Exercise Yes     Comment: 5-6x week    Seat Belt Yes     Social Determinants of Health     Financial Resource Strain: Low Risk  (1/7/2024)    Financial Resource Strain     Difficulty of Paying Living Expenses: Not hard at all     Med Affordability: No   Food Insecurity: No Food Insecurity (1/7/2024)    Food Insecurity     Food Insecurity: Never true   Transportation Needs: No Transportation Needs (1/7/2024)    Transportation Needs     Lack of Transportation: No   Stress: No Stress Concern Present (1/7/2024)    Stress     Feeling of Stress : No   Housing Stability: Low Risk  (1/7/2024)    Housing Stability     Housing Instability: No        Medications (Active prior to today's visit):  Current Outpatient Medications   Medication  Sig Dispense Refill    polyethylene glycol, PEG 3350, 17 g Oral Powd Pack Take 17 g by mouth daily.      prenatal vitamin with DHA 27-0.8-228 MG Oral Cap Take 1 capsule by mouth daily.         Allergies:  Allergies   Allergen Reactions    Penicillin G RASH    Penicillins RASH       PSFH elements reviewed from today and agreed except as otherwise stated in HPI.  ROS:     Review of Systems   Constitutional:  Negative for appetite change, chills, fever and unexpected weight change.   Gastrointestinal:  Negative for blood in stool, constipation, diarrhea, nausea and vomiting.   Musculoskeletal:  Negative for myalgias.         Pertinent positives and negatives noted in the the HPI.    PHYSICAL EXAM:   /60 (BP Location: Right arm, Patient Position: Sitting, Cuff Size: adult)   Pulse 86   Resp 16   Wt 124 lb 9.6 oz (56.5 kg)   LMP 04/03/2023 (Exact Date)   SpO2 97%   Breastfeeding Yes   BMI 24.33 kg/m²   Vital signs reviewed.Appears stated age, well groomed.  Physical Exam  Vitals reviewed.   Constitutional:       General: She is not in acute distress.     Appearance: Normal appearance. She is not toxic-appearing.   HENT:      Head: Normocephalic.   Cardiovascular:      Rate and Rhythm: Normal rate and regular rhythm.      Pulses: Normal pulses.      Heart sounds: Normal heart sounds.   Pulmonary:      Effort: Pulmonary effort is normal.      Breath sounds: Normal breath sounds.   Abdominal:      General: Bowel sounds are normal.      Palpations: Abdomen is soft.      Comments: Umbilicus appears normal- no erythema, no edema, no drainage.    Neurological:      Mental Status: She is alert and oriented to person, place, and time.   Psychiatric:         Behavior: Behavior normal.          LABS     No visits with results within 2 Month(s) from this visit.   Latest known visit with results is:   Office Visit on 03/28/2024   Component Date Value    Case Report 03/28/2024                      Value:Surgical Pathology                                 Case: [id number]                                   Authorizing Provider:  Tyra Pereira MD       Collected:           03/28/2024 03:45 PM          Ordering Location:     [facility]    Received:            03/28/2024 03:45 PM                                 [facility] - OB/GYN                                                          Pathologist:           Juan Manuel Mann MD                                                         Specimen:    Vaginal, vaginal polyp                                                                     Final Diagnosis: 03/28/2024                      Value:This result contains rich text formatting which cannot be displayed here.    Final Diagnosis Comment 03/28/2024                      Value:This result contains rich text formatting which cannot be displayed here.    Ancillary Studies 03/28/2024                      Value:This result contains rich text formatting which cannot be displayed here.    Clinical Information 03/28/2024                      Value:This result contains rich text formatting which cannot be displayed here.    Gross Description 03/28/2024                      Value:This result contains rich text formatting which cannot be displayed here.      REVIEWED THIS VISIT  ASSESSMENT/PLAN:   34 year old female with    1. Cellulitis of umbilicus  - appears to have resolved with her self tx at home  - if sx worsen or recur, please RTC      Meds This Visit:  Requested Prescriptions      No prescriptions requested or ordered in this encounter       Health Maintenance:  Health Maintenance   Topic Date Due    Annual Physical  Never done    DTaP,Tdap,and Td Vaccines (1 - Tdap) 07/26/2024 (Originally 1/1/2007)    COVID-19 Vaccine (1 - 2023-24 season) 07/26/2024 (Originally 9/1/2023)    Influenza Vaccine (Season Ended) 10/01/2024    Pap  Smear  06/28/2026    Annual Depression Screening  Completed    Pneumococcal Vaccine: Birth to 64yrs  Aged Out         Patient/Caregiver Education: There are no barriers to learning. Medical education done.   Outcome: Patient verbalizes understanding and agrees with plan. Advised to call or RTC if symptoms persist or worsen.    Problem List:     Patient Active Problem List   Diagnosis    ASCUS with positive high risk HPV cervical    Syncopal episodes    Vulvar skin tag    Condyloma acuminata    IUD (intrauterine device) in place       Imaging & Referrals:  None     6/28/2024  Liliane Rahman MD      Patient understands plan and follow-up.  Return for annual physical overdue.

## 2024-07-01 ENCOUNTER — APPOINTMENT (OUTPATIENT)
Dept: PHYSICAL THERAPY | Facility: HOSPITAL | Age: 34
End: 2024-07-01
Attending: STUDENT IN AN ORGANIZED HEALTH CARE EDUCATION/TRAINING PROGRAM
Payer: COMMERCIAL

## 2024-07-08 ENCOUNTER — APPOINTMENT (OUTPATIENT)
Dept: PHYSICAL THERAPY | Facility: HOSPITAL | Age: 34
End: 2024-07-08
Attending: STUDENT IN AN ORGANIZED HEALTH CARE EDUCATION/TRAINING PROGRAM
Payer: COMMERCIAL

## 2024-07-15 ENCOUNTER — APPOINTMENT (OUTPATIENT)
Dept: PHYSICAL THERAPY | Facility: HOSPITAL | Age: 34
End: 2024-07-15
Attending: STUDENT IN AN ORGANIZED HEALTH CARE EDUCATION/TRAINING PROGRAM
Payer: COMMERCIAL

## 2024-07-17 ENCOUNTER — OFFICE VISIT (OUTPATIENT)
Dept: OBGYN CLINIC | Facility: CLINIC | Age: 34
End: 2024-07-17
Payer: COMMERCIAL

## 2024-07-17 VITALS
BODY MASS INDEX: 24 KG/M2 | DIASTOLIC BLOOD PRESSURE: 62 MMHG | HEART RATE: 92 BPM | WEIGHT: 121 LBS | SYSTOLIC BLOOD PRESSURE: 112 MMHG

## 2024-07-17 DIAGNOSIS — N89.8 VAGINAL LESION: Primary | ICD-10-CM

## 2024-07-17 DIAGNOSIS — Z12.4 SCREENING FOR CERVICAL CANCER: ICD-10-CM

## 2024-07-17 PROCEDURE — 87624 HPV HI-RISK TYP POOLED RSLT: CPT | Performed by: OBSTETRICS & GYNECOLOGY

## 2024-07-17 PROCEDURE — 99213 OFFICE O/P EST LOW 20 MIN: CPT | Performed by: OBSTETRICS & GYNECOLOGY

## 2024-07-17 PROCEDURE — 88305 TISSUE EXAM BY PATHOLOGIST: CPT | Performed by: OBSTETRICS & GYNECOLOGY

## 2024-07-17 PROCEDURE — 88175 CYTOPATH C/V AUTO FLUID REDO: CPT | Performed by: OBSTETRICS & GYNECOLOGY

## 2024-07-17 PROCEDURE — 88342 IMHCHEM/IMCYTCHM 1ST ANTB: CPT | Performed by: OBSTETRICS & GYNECOLOGY

## 2024-07-17 NOTE — PROGRESS NOTES
Subjective:  34 year old    Chief Complaint   Patient presents with    Other     Skin tags, feeling like there is an another polyps      Patient presents for follow up vulvar lesion and vulvar condyloma.  Patient had one vulvar condylomatous lesion excised and two others treated with TCA 2024.  The excised lesion showed .  Also had a vulvar lesion near the OB laceration site removed by Dr. Pereira 3/2024.  Patient without complaints.  Also history of ASCUS positive HPV 2023, requesting Pap    Review of Systems:  Pertinent items are noted in the HPI.    Objective:  /62   Pulse 92   Wt 121 lb (54.9 kg)     Physical Examination:  General appearance: Well dressed, well nourished in no apparent distress  Neurologic/Psychiatric: Alert and oriented to person, place and time, mood normal, affect appropriate  Pelvic:    External genitalia- Normal, Bartholin's, urethra, skeins glands normal   Vagina- No vaginal lesions, no discharge, 1 x 0.5 cm polypoid pink lesion growing from the hymenal ring at 5 o'clock position   Cervix- No lesions, long/closed, no cervical motion tenderness, strings long at 4 cm    BSUS IUD displaced towards cervix    Assessment/Plan:  Hx of condyloma and JUSTINO 2- normal exam today  Displacement of IUD- recommend abstain from unprotected sex and return to office next available for replacement of IUD under US guidance.  Vaginal lesion- likely granulation tissue from recent OB laceration.  Recommend excision.    Diagnoses and all orders for this visit:    Vaginal lesion  -     Cancel: Specimen to Pathology Tissue  -     Specimen to Pathology Tissue    Screening for cervical cancer  -     ThinPrep PAP Smear; Future  -     Hpv Dna  High Risk , Thin Prep Collect; Future      Return for IUD removal and replacement with US guidance.

## 2024-07-17 NOTE — PROCEDURES
Biopsy Procedure Note    Post-operative Diagnosis: polypoid lesion at hymenal ring 5 O'clock.    Procedure Details   The risks (including infection, bleeding, pain, and scarring) and benefits of the procedure were explained to the patient and verbal informed consent was obtained.    The patient was noted to have a 1 x 0.5 cm polypoid lesion projecting from hymenal ring at 5 O'clock position.  The area was prepped with betadine solution.  The area was infiltrated with 1 cc of 1% lidocaine solution.  Using a scalpel, the entire lesion was excised with adequate margins.  1 stitch of 4.0 vicryl was placed to re-approximate the skin edges, with good re-approximation noted.  Antibiotic ointment was applied.    Condition:  Stable    Complications:  None    Plan:  The patient was advised to call for any fever, worsening discharge/redness/swelling, moderate/severe pain or moderate/heavy bleeding. She was advised to use ibuprofen as needed for mild pain.      Follow up as needed.    Diagnoses and all orders for this visit:    Vaginal lesion  -     Cancel: Specimen to Pathology Tissue  -     Specimen to Pathology Tissue    Screening for cervical cancer  -     ThinPrep PAP Smear; Future  -     Hpv Dna  High Risk , Thin Prep Collect; Future

## 2024-07-18 LAB — HPV E6+E7 MRNA CVX QL NAA+PROBE: NEGATIVE

## 2024-07-22 ENCOUNTER — APPOINTMENT (OUTPATIENT)
Dept: PHYSICAL THERAPY | Facility: HOSPITAL | Age: 34
End: 2024-07-22
Attending: STUDENT IN AN ORGANIZED HEALTH CARE EDUCATION/TRAINING PROGRAM
Payer: COMMERCIAL

## 2024-07-22 LAB
.: NORMAL
.: NORMAL

## 2024-07-24 PROBLEM — N89.1 VAIN II (VAGINAL INTRAEPITHELIAL NEOPLASIA GRADE II): Status: ACTIVE | Noted: 2024-07-24

## 2024-07-29 ENCOUNTER — APPOINTMENT (OUTPATIENT)
Dept: PHYSICAL THERAPY | Facility: HOSPITAL | Age: 34
End: 2024-07-29
Attending: STUDENT IN AN ORGANIZED HEALTH CARE EDUCATION/TRAINING PROGRAM
Payer: COMMERCIAL

## 2024-07-31 ENCOUNTER — OFFICE VISIT (OUTPATIENT)
Dept: OBGYN CLINIC | Facility: CLINIC | Age: 34
End: 2024-07-31
Payer: COMMERCIAL

## 2024-07-31 VITALS
WEIGHT: 120.5 LBS | DIASTOLIC BLOOD PRESSURE: 68 MMHG | HEART RATE: 72 BPM | SYSTOLIC BLOOD PRESSURE: 110 MMHG | BODY MASS INDEX: 24 KG/M2

## 2024-07-31 DIAGNOSIS — N90.1 VULVAR INTRAEPITHELIAL NEOPLASIA (VIN) GRADE 2: ICD-10-CM

## 2024-07-31 DIAGNOSIS — N89.1 VAIN II (VAGINAL INTRAEPITHELIAL NEOPLASIA GRADE II): Primary | ICD-10-CM

## 2024-07-31 PROCEDURE — 99213 OFFICE O/P EST LOW 20 MIN: CPT | Performed by: OBSTETRICS & GYNECOLOGY

## 2024-07-31 NOTE — PROGRESS NOTES
Subjective:  34 year old    Chief Complaint   Patient presents with    Other     Return exam     Follow up to VAIN and JUSTINO.  Patient had condyloma of left vulva treated with TCA and one lesion excised, which showed JUSTINO 2 with negative margins.  Developed granulation tissue at site of 2nd degree perineal laceration and tissue was removed x 2 with pathology showing VAIN 2 with negative margins.  Patient without complaints    Review of Systems:  Pertinent items are noted in the HPI.    Objective:  /68   Pulse 72   Wt 120 lb 8 oz (54.7 kg)     Physical Examination:  General appearance: Well dressed, well nourished in no apparent distress  Neurologic/Psychiatric: Alert and oriented to person, place and time, mood normal, affect appropriate  Abdomen: Soft, non-tender, non-distended, no masses, no hepatosplenomegaly, no hernias, no inguinal lymphadenopathy  Pelvic:    External genitalia- normal.  No skin lesions.  Perineal laceration site well healed.  3 mm strand of suture protruding from healthy skin.  Suture grasped, elevated and excised.    Assessment/Plan:  Hx of JUSTINO 2 and VAIN 2, both excised with negative margins.  Discussed long term management to monitor for risk of recurrence.  Follow up September for IUD insertion and then follow up 6 mos for exam.    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

## 2024-09-10 ENCOUNTER — OFFICE VISIT (OUTPATIENT)
Dept: OBGYN CLINIC | Facility: CLINIC | Age: 34
End: 2024-09-10
Payer: COMMERCIAL

## 2024-09-10 VITALS
WEIGHT: 123.63 LBS | BODY MASS INDEX: 24.27 KG/M2 | HEIGHT: 60 IN | DIASTOLIC BLOOD PRESSURE: 72 MMHG | SYSTOLIC BLOOD PRESSURE: 100 MMHG | HEART RATE: 69 BPM

## 2024-09-10 DIAGNOSIS — Z30.433 ENCOUNTER FOR REMOVAL AND REINSERTION OF IUD: Primary | ICD-10-CM

## 2024-09-10 DIAGNOSIS — Z01.818 PREPROCEDURAL EXAMINATION: ICD-10-CM

## 2024-09-10 PROCEDURE — 58301 REMOVE INTRAUTERINE DEVICE: CPT | Performed by: OBSTETRICS & GYNECOLOGY

## 2024-09-10 PROCEDURE — 58300 INSERT INTRAUTERINE DEVICE: CPT | Performed by: OBSTETRICS & GYNECOLOGY

## 2024-09-10 PROCEDURE — 81025 URINE PREGNANCY TEST: CPT | Performed by: OBSTETRICS & GYNECOLOGY

## 2024-09-10 NOTE — PROCEDURES
IUD Removal/Insertion Procedure Note    Indications: Desires contraception    Procedure Details:   Urine pregnancy test negative.  No contraindications.  The risks including infection with effects on future fertility and need for antibiotics, bleeding, expulsion, and uterine perforation with need for additional surgery and benefits of the procedure were explained to the patient and informed consent obtained.    Bimanual examination was performed and the uterus noted to be anteverted.  Sterile speculum inserted and IUD strings visualized.  The cervix was prepped with betadine solution.   Using ring forceps, the strings were grasped and the IUD removed intact without complications and without difficulty.  Benzocaine gel 20% applied to anterior cervix.  The uterus was sounded to 7 cms.  A new Mirena IUD was inserted without complications using correct insertion technique.  The strings were trimmed to 3 cms.  Signs and symptoms of complications reviewed and patient was given the patient information pamphlet.    Condition:  Stable    Complications:  None    Plan:  The patient was advised to call for any fever or for prolonged or severe pain or bleeding. Follow up one month for IUD check, and refrain for intercourse until next visit.  's information booklet given to patient.

## 2024-09-19 ENCOUNTER — OFFICE VISIT (OUTPATIENT)
Dept: FAMILY MEDICINE CLINIC | Facility: CLINIC | Age: 34
End: 2024-09-19
Payer: COMMERCIAL

## 2024-09-19 VITALS
WEIGHT: 125.63 LBS | HEART RATE: 74 BPM | BODY MASS INDEX: 25 KG/M2 | SYSTOLIC BLOOD PRESSURE: 114 MMHG | OXYGEN SATURATION: 96 % | RESPIRATION RATE: 18 BRPM | DIASTOLIC BLOOD PRESSURE: 62 MMHG | TEMPERATURE: 99 F

## 2024-09-19 DIAGNOSIS — M65.4 DE QUERVAIN'S TENOSYNOVITIS, LEFT: Primary | ICD-10-CM

## 2024-09-19 PROCEDURE — 99214 OFFICE O/P EST MOD 30 MIN: CPT | Performed by: FAMILY MEDICINE

## 2024-09-19 RX ORDER — PREDNISONE 20 MG/1
TABLET ORAL
Qty: 10 TABLET | Refills: 0 | Status: SHIPPED | OUTPATIENT
Start: 2024-09-19

## 2024-09-19 NOTE — PROGRESS NOTES
CHIEF COMPLAINT:   Chief Complaint   Patient presents with    Wrist Pain     Left side x 3 months          HPI:     Dasia Mar is a 34 year old female presents for wrist pain.    Wrist pain: pt has a several week history of left wrist pain, has been persistent. She has a 9 mo old baby, she is the main caregiver.  She has tried taking Ibuprofen at home with some relief.  She also has tried wearing a writs brace, but finds this difficult when trying to position her baby for breastfeeding.  She has noticed some swelling. She feels pain radiating up her forearm.  She is unable to rest since she take care of her dtr all day.              HISTORY:  No past medical history on file.   No past surgical history on file.   Family History   Problem Relation Age of Onset    Accidental death Father     No Known Problems Mother     Cancer Paternal Grandfather     No Known Problems Brother       Social History:   Social History     Socioeconomic History    Marital status:    Tobacco Use    Smoking status: Former     Current packs/day: 0.00     Types: Cigarettes    Smokeless tobacco: Never   Vaping Use    Vaping status: Never Used   Substance and Sexual Activity    Alcohol use: Not Currently    Drug use: Never    Sexual activity: Yes     Partners: Male     Birth control/protection: I.U.D., Mirena   Other Topics Concern    Caffeine Concern Yes     Comment: 2x cup daily    Exercise Yes     Comment: 5-6x week    Seat Belt Yes     Social Determinants of Health     Financial Resource Strain: Low Risk  (1/7/2024)    Financial Resource Strain     Difficulty of Paying Living Expenses: Not hard at all     Med Affordability: No   Food Insecurity: No Food Insecurity (1/7/2024)    Food Insecurity     Food Insecurity: Never true   Transportation Needs: No Transportation Needs (1/7/2024)    Transportation Needs     Lack of Transportation: No   Stress: No Stress Concern Present (1/7/2024)    Stress     Feeling of Stress : No   Housing  Stability: Low Risk  (1/7/2024)    Housing Stability     Housing Instability: No        Medications (Active prior to today's visit):  Current Outpatient Medications   Medication Sig Dispense Refill    predniSONE 20 MG Oral Tab Take 2 tab (40 MG) PO QD x 5 days 10 tablet 0    polyethylene glycol, PEG 3350, 17 g Oral Powd Pack Take 17 g by mouth daily.      prenatal vitamin with DHA 27-0.8-228 MG Oral Cap Take 1 capsule by mouth daily.         Allergies:  Allergies   Allergen Reactions    Penicillin G RASH    Penicillins RASH       PSFH elements reviewed from today and agreed except as otherwise stated in HPI.  ROS:     Review of Systems   Constitutional:  Negative for chills and fever.   Musculoskeletal:  Positive for arthralgias and joint swelling.   Neurological:  Negative for weakness and numbness.         Pertinent positives and negatives noted in the the HPI.    PHYSICAL EXAM:   /62 (BP Location: Left arm, Patient Position: Sitting, Cuff Size: adult)   Pulse 74   Temp 98.6 °F (37 °C) (Temporal)   Resp 18   Wt 125 lb 9.6 oz (57 kg)   SpO2 96%   Breastfeeding Yes   BMI 24.53 kg/m²   Vital signs reviewed.Appears stated age, well groomed.  Physical Exam  Vitals reviewed.   Constitutional:       Appearance: Normal appearance.   HENT:      Head: Normocephalic.   Cardiovascular:      Rate and Rhythm: Normal rate and regular rhythm.      Pulses: Normal pulses.      Heart sounds: Normal heart sounds.   Pulmonary:      Effort: Pulmonary effort is normal.      Breath sounds: Normal breath sounds.   Musculoskeletal:         General: Swelling and tenderness present.      Comments: Left wrist: mild edema along left medial wrist, tender with extension at wrist; + Finkelstein test   Skin:     General: Skin is warm and dry.   Neurological:      Mental Status: She is alert and oriented to person, place, and time.   Psychiatric:         Behavior: Behavior normal.          LABS     Office Visit on 09/10/2024    Component Date Value    Pregnancy Test, Urine 09/10/2024 negative     Control Line Present wit* 09/10/2024 yes     Kit Lot # 09/10/2024 [id number]     Kit Expiration Date 09/10/2024 4.8.25       REVIEWED THIS VISIT  ASSESSMENT/PLAN:   34 year old female with    1. De Quervain's tenosynovitis, left  - only some relief with Ibuprofen; will avoid Meloxicam since this is long-acting and interferes with breastfeeding  - recommend Prednisone x 5 days, R/B/SE of new med d/w pt  - cont wearing brace with thumb stabilization as tolerated  - if no relief with medication, then recommend referral to Ortho for possible steroid injection    - predniSONE 20 MG Oral Tab; Take 2 tab (40 MG) PO QD x 5 days  Dispense: 10 tablet; Refill: 0  - Ortho Referral - In [facility]      Meds This Visit:  Requested Prescriptions     Signed Prescriptions Disp Refills    predniSONE 20 MG Oral Tab 10 tablet 0     Sig: Take 2 tab (40 MG) PO QD x 5 days       Health Maintenance:  Health Maintenance   Topic Date Due    Annual Physical  Never done    DTaP,Tdap,and Td Vaccines (1 - Tdap) Never done    COVID-19 Vaccine (1 - 2023-24 season) Never done    Influenza Vaccine (1) 10/01/2024    Pap Smear  07/17/2027    Annual Depression Screening  Completed    Pneumococcal Vaccine: Birth to 64yrs  Aged Out         Patient/Caregiver Education: There are no barriers to learning. Medical education done.   Outcome: Patient verbalizes understanding and agrees with plan. Advised to call or RTC if symptoms persist or worsen.    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  Return for annual  physical overdue.

## 2024-10-08 ENCOUNTER — OFFICE VISIT (OUTPATIENT)
Dept: OBGYN CLINIC | Facility: CLINIC | Age: 34
End: 2024-10-08
Payer: COMMERCIAL

## 2024-10-08 VITALS
SYSTOLIC BLOOD PRESSURE: 108 MMHG | BODY MASS INDEX: 24.58 KG/M2 | WEIGHT: 125.19 LBS | HEIGHT: 60 IN | DIASTOLIC BLOOD PRESSURE: 64 MMHG | HEART RATE: 64 BPM

## 2024-10-08 DIAGNOSIS — Z30.431 IUD CHECK UP: Primary | ICD-10-CM

## 2024-10-08 PROCEDURE — 99213 OFFICE O/P EST LOW 20 MIN: CPT | Performed by: OBSTETRICS & GYNECOLOGY

## 2024-10-08 NOTE — PROGRESS NOTES
Subjective:  Dasia Mar presents for follow up to IUD insertion.    She denies any pain, fever or vaginal discharge.  She has had minimal bleeding since insertion.    Objective:  Physical Exam:   /64   Pulse 64   Ht 60\"   Wt 125 lb 3.2 oz (56.8 kg)   BMI 24.45 kg/m²    General: Comfortable, no apparent distress  Abdomen: Soft, non-tender, non-distended, no masses  Pelvic: External genitalia normal, no cervical or vaginal lesions, no discharge  Cervix: IUD string visible 3 cm  Bimanual examination:  Uterus non-tender, no cervical motion tenderness, adnexa non-tender, no masses    Assessment/Plan:  Normal IUD follow up examination  Follow up as needed or for routine annual gynecologic examination    Diagnoses and all orders for this visit:    IUD check up      Return for Annual Well Woman Exam.

## 2024-11-01 DIAGNOSIS — M25.532 LEFT WRIST PAIN: Primary | ICD-10-CM

## 2024-11-01 DIAGNOSIS — M79.642 LEFT HAND PAIN: ICD-10-CM

## 2024-11-04 ENCOUNTER — OFFICE VISIT (OUTPATIENT)
Dept: ORTHOPEDICS CLINIC | Facility: CLINIC | Age: 34
End: 2024-11-04
Payer: COMMERCIAL

## 2024-11-04 ENCOUNTER — HOSPITAL ENCOUNTER (OUTPATIENT)
Dept: GENERAL RADIOLOGY | Age: 34
Discharge: HOME | End: 2024-11-04
Attending: STUDENT IN AN ORGANIZED HEALTH CARE EDUCATION/TRAINING PROGRAM
Payer: COMMERCIAL

## 2024-11-04 VITALS — HEIGHT: 60 IN | WEIGHT: 120 LBS | BODY MASS INDEX: 23.56 KG/M2

## 2024-11-04 DIAGNOSIS — M65.4 DE QUERVAIN'S DISEASE (RADIAL STYLOID TENOSYNOVITIS): Primary | ICD-10-CM

## 2024-11-04 DIAGNOSIS — M25.532 LEFT WRIST PAIN: ICD-10-CM

## 2024-11-04 DIAGNOSIS — M79.642 LEFT HAND PAIN: ICD-10-CM

## 2024-11-04 PROCEDURE — 73130 X-RAY EXAM OF HAND: CPT | Performed by: STUDENT IN AN ORGANIZED HEALTH CARE EDUCATION/TRAINING PROGRAM

## 2024-11-04 PROCEDURE — 99204 OFFICE O/P NEW MOD 45 MIN: CPT | Performed by: STUDENT IN AN ORGANIZED HEALTH CARE EDUCATION/TRAINING PROGRAM

## 2024-11-04 PROCEDURE — 20550 NJX 1 TENDON SHEATH/LIGAMENT: CPT | Performed by: STUDENT IN AN ORGANIZED HEALTH CARE EDUCATION/TRAINING PROGRAM

## 2024-11-04 PROCEDURE — 73110 X-RAY EXAM OF WRIST: CPT | Performed by: STUDENT IN AN ORGANIZED HEALTH CARE EDUCATION/TRAINING PROGRAM

## 2024-11-04 RX ORDER — BETAMETHASONE SODIUM PHOSPHATE AND BETAMETHASONE ACETATE 3; 3 MG/ML; MG/ML
6 INJECTION, SUSPENSION INTRA-ARTICULAR; INTRALESIONAL; INTRAMUSCULAR; SOFT TISSUE ONCE
Status: COMPLETED | OUTPATIENT
Start: 2024-11-04 | End: 2024-11-04

## 2024-11-04 RX ADMIN — BETAMETHASONE SODIUM PHOSPHATE AND BETAMETHASONE ACETATE 6 MG: 3; 3 INJECTION, SUSPENSION INTRA-ARTICULAR; INTRALESIONAL; INTRAMUSCULAR; SOFT TISSUE at 09:30:00

## 2024-11-04 NOTE — PROGRESS NOTES
Clinic Note     Allergies[1]    CC: Left wrist pain    HPI: This 34 year old RHD female presents with Left radial sided wrist pain. She has a 10-month old at home and states her symptoms began as she started to carry her child more often.    Onset: Gradual  Pain Characterization: Throbbing  Pain Level: moderate    Treatments Tried: medrol dosepak without relief, otc soft brace without much relief    History reviewed. No pertinent past medical history.  History reviewed. No pertinent surgical history.  Current Outpatient Medications   Medication Sig Dispense Refill    predniSONE 20 MG Oral Tab Take 2 tab (40 MG) PO QD x 5 days (Patient not taking: Reported on 10/8/2024) 10 tablet 0    polyethylene glycol, PEG 3350, 17 g Oral Powd Pack Take 17 g by mouth daily.      prenatal vitamin with DHA 27-0.8-228 MG Oral Cap Take 1 capsule by mouth daily.       Family History   Problem Relation Age of Onset    Accidental death Father     No Known Problems Mother     Cancer Paternal Grandfather     No Known Problems Brother      Social History     Occupational History    Not on file   Tobacco Use    Smoking status: Former     Current packs/day: 0.00     Types: Cigarettes    Smokeless tobacco: Never    Tobacco comments:     Updated 11/4/24   Vaping Use    Vaping status: Never Used   Substance and Sexual Activity    Alcohol use: Not Currently    Drug use: Never    Sexual activity: Yes     Partners: Male     Birth control/protection: I.U.D., Mirena        Review of Systems:  Negative unless stated in HPI.      Physical Exam:  Ht 5' (1.524 m)   Wt 120 lb (54.4 kg)   BMI 23.44 kg/m²     Constitutional: NAD. AOx3. Well-developed and Well-nourished.   Psychiatric: Normal mood/ affect/ behavior. Judgment and thought content normal.     Left Upper Extremity:   Inspection: Skin Intact. No skin lesions. No gross deformity. Swelling at the first dorsal compartment.   Palpation: TTP over first dorsal compartment. No tenderness to  palpation over 1st CMC joint. No pain elicited with axial loading of the 1st CMC joint. No tenderness of the A1 pulley of the thumb.   Motion: Elbow: normal bilateral symmetric ext/flex  Wrist: normal bilateral symmetric ext/flex/sup/pro  Finger: full composite fist   Neurovascular Normal perfused hand with brisk capillary refill in all digits. Sensation is intact light touch in the median, ulnar, and radial sensory nerve distribution.  Motor function is intact AIN, PIN, and ulnar motor nerve.     Special Tests:  + Finkelstein's test.  Wrist extension and flexion is symmetrically normal.      Diagnostic Studies:  I reviewed the images independently from the professional interpretation, and discussed my interpretation of the pertinent findings with patient/family.    11/4/2024 xrays of Left wrist and left hand: On my independent review of the radiographs, there is no acute bony abnormality noted.    Assessment/Plan:  34 year old female with Left radial-sided wrist pain.    I reviewed the patient's electronic medical record, including the pertinent office notes, medical/surgical history, medications and images. I specifically reviewed the images noted above, independently and discussed my independent interpretation of these images in combination with the pertinent positive and negative findings in my clinical exam with the patient.    Today I discussed with the patient the pathophysiology and pathoanatomy of DeQuervain's tenosynovitis as well as the treatment options. Non-operative modalities include continued rest, NSAIDs, and immobilization in thumb spica braces/splints. Indications for procedural treatments including corticosteroid injections were discussed, as were indications for surgical treatment. I reviewed the risks, alternatives, and expected outcomes of these treatment options.     Left deQuervain's tenosynovitis - Surgical and non-surgical treatment options were reviewed with the patient. Patient elected  to proceed with a corticosteroid injection and thumb spica bracing.   CSI #1 was performed today  Continue thumb spica brace x 4 weeks as cortisone begins to take effect    Procedure: Left 1st Dorsal Compartment Corticosteroid Injection   Medication(s): Betamethasone 1cc, Lidocaine 1cc  Description: The patient was indicated for a cortisone steroid injection based on clinical exam, history and continued symptoms despite non-procedural modalities. The benefits, risks and alternatives as well as expected outcomes were explained to the patient. Specific considerations discussed included: pain at the injection site, recurrence of pain requiring repeat injection or surgical intervention, infection, skin discoloration, and fat atrophy. Written consent obtained. Under sterile conditions a 2 mL combination of the medications listed above was injected to the indicated into the 1st dorsal compartment. Following the injection the patient's thumb was taken through a gentle range of motion and the first dorsal compartment was massaged. The patient tolerated the procedure well. A Finklestein's maneuver was performed shortly after the injection and resulted in no pain; compared with the patient's exam prior to the injection which resulted in pain at the 1st dorsal compartment.  Complications: None.       Follow Up: 6-8 weeks should symptoms not resolve fully    Irvin Garcia MD   Hand, Wrist, & Elbow Surgery  [email or web address]       [1]   Allergies  Allergen Reactions    Penicillin G RASH    Penicillins RASH

## 2025-05-15 ENCOUNTER — TELEPHONE (OUTPATIENT)
Dept: FAMILY MEDICINE CLINIC | Facility: CLINIC | Age: 35
End: 2025-05-15

## (undated) DIAGNOSIS — B37.3 VAGINAL CANDIDA: Primary | ICD-10-CM

## (undated) NOTE — LETTER
Dasia Mar, :1990    CONSENT FOR PROCEDURE/SEDATION    1. I authorize the performance upon Dasia Mar  the following: Intrauterine Device Removal and Insertion of IUD    2. I authorize Dr. Jere Schmid MD (and whomever is designated as the doctor’s assistant), to perform the above-mentioned procedures.    3. If any unforeseen conditions arise during this procedure calling for additional  procedures, operations, or medications (including anesthesia and blood transfusion), I further request and authorize the doctor to do whatever he/she deems advisable in my interest.    4. I consent to the taking and reproduction of any photographs in the course of this procedure for professional purposes.    5. I consent to the administration of such sedation as may be considered necessary or advisable by the physician responsible for this service, with the exception of ______________________________________________________    6. I have been informed by my doctor of the nature and purpose of this procedure sedation, possible alternative methods of treatment, risk involved and possible complications.    7. If I have a Do Not Resuscitate (DNR) order in place, the physician and I (or the individual authorized to consent on my behalf) will discuss and agree as to whether the Do Not Resuscitate (DNR) order will remain in effect or will be discontinued during the performance of the procedure and the applicable recovery period. If the Do Not Resuscitate (DNR) order is discontinued and is to be reinstated following the procedure/recovery period, the physician will determine when the applicable recovery period ends for purposes of reinstating the Do Not Resuscitate (DNR) order.    Signature of Patient:_______________________________________________    Signature of person authorized to consent for patient:  _______________________________________________________________    Relationship to patient:  ____________________________________________    Witness: _________________________________________ Date:___________     Physician Signature: _______________________________ Date:___________

## (undated) NOTE — LETTER
Dasia Mar, :1990    CONSENT FOR PROCEDURE/SEDATION    1. I authorize the performance upon Dasia Mar  the following: Polypectomy    2. I authorize Dr. Tyra Pereira MD (and whomever is designated as the doctor’s assistant), to perform the above-mentioned procedures.    3. If any unforeseen conditions arise during this procedure calling for additional  procedures, operations, or medications (including anesthesia and blood transfusion), I further request and authorize the doctor to do whatever he/she deems advisable in my interest.    4. I consent to the taking and reproduction of any photographs in the course of this procedure for professional purposes.    5. I consent to the administration of such sedation as may be considered necessary or advisable by the physician responsible for this service, with the exception of ______________________________________________________    6. I have been informed by my doctor of the nature and purpose of this procedure sedation, possible alternative methods of treatment, risk involved and possible complications.    7. If I have a Do Not Resuscitate (DNR) order in place, the physician and I (or the individual authorized to consent on my behalf) will discuss and agree as to whether the Do Not Resuscitate (DNR) order will remain in effect or will be discontinued during the performance of the procedure and the applicable recovery period. If the Do Not Resuscitate (DNR) order is discontinued and is to be reinstated following the procedure/recovery period, the physician will determine when the applicable recovery period ends for purposes of reinstating the Do Not Resuscitate (DNR) order.    Signature of Patient:_______________________________________________    Signature of person authorized to consent for patient:  _______________________________________________________________    Relationship to patient: ____________________________________________    Witness:  __Self___________________________________ Date:__03/28/2024__     Physician Signature: _______________________________ Date:__03/28/2024__

## (undated) NOTE — LETTER
Dasia Mar, :1990    CONSENT FOR PROCEDURE/SEDATION    1. I authorize the performance upon Dasia Mar  the following: Intrauterine Device Mirena-insertion    2. I authorize Dr. Jane Prescott MD (and whomever is designated as the doctor’s assistant), to perform the above-mentioned procedures.    3. If any unforeseen conditions arise during this procedure calling for additional  procedures, operations, or medications (including anesthesia and blood transfusion), I further request and authorize the doctor to do whatever he/she deems advisable in my interest.    4. I consent to the taking and reproduction of any photographs in the course of this procedure for professional purposes.    5. I consent to the administration of such sedation as may be considered necessary or advisable by the physician responsible for this service, with the exception of ______________________________________________________    6. I have been informed by my doctor of the nature and purpose of this procedure sedation, possible alternative methods of treatment, risk involved and possible complications.    7. If I have a Do Not Resuscitate (DNR) order in place, the physician and I (or the individual authorized to consent on my behalf) will discuss and agree as to whether the Do Not Resuscitate (DNR) order will remain in effect or will be discontinued during the performance of the procedure and the applicable recovery period. If the Do Not Resuscitate (DNR) order is discontinued and is to be reinstated following the procedure/recovery period, the physician will determine when the applicable recovery period ends for purposes of reinstating the Do Not Resuscitate (DNR) order.    Signature of Patient:X_______________________________________________    Signature of person authorized to consent for patient:  _______________________________________________________________    Relationship to patient:  Self____________________________________________    Witness: _________________________________________ Date:03/19/24__________     Physician Signature: _______________________________ Date:03/19/24___________

## (undated) NOTE — LETTER
Dasia Mar, :1990    CONSENT FOR PROCEDURE/SEDATION    1. I authorize the performance upon Dasia Mar  the following: Vulvar Biopsy    2. I authorize Dr. Jere Schmid MD (and whomever is designated as the doctor’s assistant), to perform the above-mentioned procedures.    3. If any unforeseen conditions arise during this procedure calling for additional  procedures, operations, or medications (including anesthesia and blood transfusion), I further request and authorize the doctor to do whatever he/she deems advisable in my interest.    4. I consent to the taking and reproduction of any photographs in the course of this procedure for professional purposes.    5. I consent to the administration of such sedation as may be considered necessary or advisable by the physician responsible for this service, with the exception of ______________________________________________________    6. I have been informed by my doctor of the nature and purpose of this procedure sedation, possible alternative methods of treatment, risk involved and possible complications.    7. If I have a Do Not Resuscitate (DNR) order in place, the physician and I (or the individual authorized to consent on my behalf) will discuss and agree as to whether the Do Not Resuscitate (DNR) order will remain in effect or will be discontinued during the performance of the procedure and the applicable recovery period. If the Do Not Resuscitate (DNR) order is discontinued and is to be reinstated following the procedure/recovery period, the physician will determine when the applicable recovery period ends for purposes of reinstating the Do Not Resuscitate (DNR) order.    Signature of Patient:_______________________________________________    Signature of person authorized to consent for patient:  _______________________________________________________________    Relationship to patient: ____________________________________________    Witness:  _________________________________________ Date:___________     Physician Signature: _______________________________ Date:___________